# Patient Record
Sex: FEMALE | Race: BLACK OR AFRICAN AMERICAN | NOT HISPANIC OR LATINO | Employment: OTHER | URBAN - METROPOLITAN AREA
[De-identification: names, ages, dates, MRNs, and addresses within clinical notes are randomized per-mention and may not be internally consistent; named-entity substitution may affect disease eponyms.]

---

## 2022-09-22 ENCOUNTER — IMMUNIZATION (OUTPATIENT)
Dept: PRIMARY CARE CLINIC | Facility: CLINIC | Age: 72
End: 2022-09-22
Payer: MEDICARE

## 2022-09-22 DIAGNOSIS — Z23 NEED FOR VACCINATION: Primary | ICD-10-CM

## 2022-09-22 PROCEDURE — 91312 COVID-19, MRNA, LNP-S, BIVALENT BOOSTER, PF, 30 MCG/0.3 ML DOSE: CPT | Mod: PBBFAC | Performed by: INTERNAL MEDICINE

## 2022-09-22 PROCEDURE — 0124A COVID-19, MRNA, LNP-S, BIVALENT BOOSTER, PF, 30 MCG/0.3 ML DOSE: CPT | Mod: CV19,PBBFAC | Performed by: INTERNAL MEDICINE

## 2023-10-26 DIAGNOSIS — M25.561 RIGHT KNEE PAIN, UNSPECIFIED CHRONICITY: Primary | ICD-10-CM

## 2023-11-02 ENCOUNTER — HOSPITAL ENCOUNTER (OUTPATIENT)
Dept: RADIOLOGY | Facility: HOSPITAL | Age: 73
Discharge: HOME | End: 2023-11-02
Attending: ORTHOPAEDIC SURGERY
Payer: MEDICARE

## 2023-11-02 ENCOUNTER — OFFICE VISIT (OUTPATIENT)
Dept: ORTHOPEDICS | Facility: CLINIC | Age: 73
End: 2023-11-02
Payer: MEDICARE

## 2023-11-02 VITALS
HEART RATE: 79 BPM | BODY MASS INDEX: 36.6 KG/M2 | SYSTOLIC BLOOD PRESSURE: 149 MMHG | HEIGHT: 62 IN | DIASTOLIC BLOOD PRESSURE: 79 MMHG | WEIGHT: 198.88 LBS

## 2023-11-02 DIAGNOSIS — M25.561 RIGHT KNEE PAIN, UNSPECIFIED CHRONICITY: ICD-10-CM

## 2023-11-02 DIAGNOSIS — G89.29 CHRONIC PAIN OF RIGHT KNEE: ICD-10-CM

## 2023-11-02 DIAGNOSIS — M17.11 PRIMARY OSTEOARTHRITIS OF RIGHT KNEE: Primary | ICD-10-CM

## 2023-11-02 DIAGNOSIS — M25.561 CHRONIC PAIN OF RIGHT KNEE: ICD-10-CM

## 2023-11-02 PROCEDURE — 73564 XR KNEE COMP 4 OR MORE VIEWS RIGHT: ICD-10-PCS | Mod: 26,RT,, | Performed by: RADIOLOGY

## 2023-11-02 PROCEDURE — 99999PBSHW PR PBB SHADOW TECHNICAL ONLY FILED TO HB: Mod: PBBFAC,,,

## 2023-11-02 PROCEDURE — 20610 DRAIN/INJ JOINT/BURSA W/O US: CPT | Mod: PBBFAC,PN,RT | Performed by: ORTHOPAEDIC SURGERY

## 2023-11-02 PROCEDURE — 99204 OFFICE O/P NEW MOD 45 MIN: CPT | Mod: 25,S$PBB,, | Performed by: ORTHOPAEDIC SURGERY

## 2023-11-02 PROCEDURE — 99999 PR PBB SHADOW E&M-EST. PATIENT-LVL III: CPT | Mod: PBBFAC,,, | Performed by: ORTHOPAEDIC SURGERY

## 2023-11-02 PROCEDURE — 73564 X-RAY EXAM KNEE 4 OR MORE: CPT | Mod: TC,PN,RT

## 2023-11-02 PROCEDURE — 73564 X-RAY EXAM KNEE 4 OR MORE: CPT | Mod: 26,RT,, | Performed by: RADIOLOGY

## 2023-11-02 PROCEDURE — 99999 PR PBB SHADOW E&M-EST. PATIENT-LVL III: ICD-10-PCS | Mod: PBBFAC,,, | Performed by: ORTHOPAEDIC SURGERY

## 2023-11-02 PROCEDURE — 20610 LARGE JOINT ASPIRATION/INJECTION: R KNEE: ICD-10-PCS | Mod: S$PBB,RT,, | Performed by: ORTHOPAEDIC SURGERY

## 2023-11-02 PROCEDURE — 99999PBSHW PR PBB SHADOW TECHNICAL ONLY FILED TO HB: ICD-10-PCS | Mod: PBBFAC,,,

## 2023-11-02 PROCEDURE — 99213 OFFICE O/P EST LOW 20 MIN: CPT | Mod: PBBFAC,PN | Performed by: ORTHOPAEDIC SURGERY

## 2023-11-02 PROCEDURE — 99204 PR OFFICE/OUTPT VISIT, NEW, LEVL IV, 45-59 MIN: ICD-10-PCS | Mod: 25,S$PBB,, | Performed by: ORTHOPAEDIC SURGERY

## 2023-11-02 RX ORDER — KETOROLAC TROMETHAMINE 30 MG/ML
30 INJECTION, SOLUTION INTRAMUSCULAR; INTRAVENOUS
Status: DISCONTINUED | OUTPATIENT
Start: 2023-11-02 | End: 2023-11-02 | Stop reason: HOSPADM

## 2023-11-02 RX ORDER — BUPIVACAINE HYDROCHLORIDE 5 MG/ML
5 INJECTION, SOLUTION PERINEURAL
Status: DISCONTINUED | OUTPATIENT
Start: 2023-11-02 | End: 2023-11-02 | Stop reason: HOSPADM

## 2023-11-02 RX ORDER — LIDOCAINE HYDROCHLORIDE 10 MG/ML
4 INJECTION INFILTRATION; PERINEURAL
Status: DISCONTINUED | OUTPATIENT
Start: 2023-11-02 | End: 2023-11-02 | Stop reason: HOSPADM

## 2023-11-02 RX ADMIN — LIDOCAINE HYDROCHLORIDE 4 ML: 10 INJECTION, SOLUTION INFILTRATION; PERINEURAL at 09:11

## 2023-11-02 RX ADMIN — KETOROLAC TROMETHAMINE 30 MG: 30 INJECTION, SOLUTION INTRAMUSCULAR; INTRAVENOUS at 09:11

## 2023-11-02 RX ADMIN — BUPIVACAINE HYDROCHLORIDE 5 ML: 5 INJECTION, SOLUTION PERINEURAL at 09:11

## 2023-11-02 NOTE — PROGRESS NOTES
Subjective:      Patient ID: Luli Triplett is a 73 y.o. female.    Chief Complaint: Pain of the Right Knee      Patient ID: Luli Triplett is a 73 y.o. female.    Chief Complaint: Pain of the Right Knee      KNEE PAIN: Complains of pain to the rightknee.   PAIN LOCATED: anterior and medial  ONSET: 9 months ago.  QUALITY:  Patient states the pain is worsening  HISTORY: Previous knee injury/surgery: No  Hx:   ASSOCIATED SYMPTOM AND TRIGGERS:Standing/Weightbearing, walking, trouble w stairs, stiffness, swelling, limping, stiffness w sitting   USES ASSISTIVE DEVICE: cane  RELIEVED BY:rest, heat, ice, medication: NSAID, Tylenol used but not effective, avoiding painful activities  PATIENT DENIES: bruising, redness, deformity              Social History     Occupational History    Not on file   Tobacco Use    Smoking status: Never    Smokeless tobacco: Not on file   Substance and Sexual Activity    Alcohol use: No    Drug use: No    Sexual activity: Not on file      Review of Systems   Constitutional: Negative for diaphoresis.   HENT:  Negative for ear discharge, nosebleeds and stridor.    Eyes:  Negative for photophobia.   Cardiovascular:  Negative for syncope.   Respiratory:  Negative for hemoptysis, shortness of breath and wheezing.    Neurological:  Negative for tremors.   Psychiatric/Behavioral: Negative.           Objective:    General    Constitutional: She is oriented to person, place, and time. She appears well-developed.   HENT:   Head: Normocephalic and atraumatic.   Right Ear: External ear normal.   Left Ear: External ear normal.   Eyes: EOM are normal.   Cardiovascular:  Intact distal pulses.            Neurological: She is alert and oriented to person, place, and time.   Psychiatric: She has a normal mood and affect. Her behavior is normal. Judgment and thought content normal.     General Musculoskeletal Exam   Gait: antalgic and abnormal       Right Knee Exam     Inspection   Swelling:  present  Effusion: present    Tenderness   The patient is tender to palpation of the medial joint line.    Crepitus   The patient has crepitus of the patella.    Range of Motion   Extension:  10   Flexion:  100 abnormal     Tests   Ligament Examination   MCL - Valgus: normal (0 to 2mm)  LCL - Varus: normal    Other   Sensation: normal    Muscle Strength   Right Lower Extremity   Quadriceps:  4/5   Hamstrin/5          Assessment:       1. Primary osteoarthritis of right knee    2. Chronic pain of right knee          Plan:       We had a lengthy discussion regarding the natural history of osteoarthritis.   The patient would like to continue nonoperative management, and I think that is Reasonable. The patient has severe bone on bone knee oa. KL score 4  We went ahead and injected the right  with 1 dose of ketorolac, Marcaine, and lidocaine. We reviewed the risks (incl side effects and allergies), benefits, of all treatment options including injections.   We will see the patient back on a p.r.n. basis as their symptoms dictate.  We also did begin discussing total knee arthroplasty. The patient was given educational literature regarding knee OA and total knee arthroplasty.   I discussed a new treatment therapy called Iovera, which is cryotherapy, to provide symptomatic relief along the sensory distribution of the infrapatellar tendon branch of the saphenous nerve, AFCN, and LFCN.  The patient elected to move forward with this.  We did discuss the fact that this is a fairly novel procedure and the is very limited scientific data around this; however, it is FDA approved.  In a few patients there can be continued pain along the treated nerve but the exact risk has not been quantified but seems to be rare. The patient was given patient information and literature to review prior to the procedure as well. Based on this, the patient feels the benefits outweigh the risks.

## 2023-11-02 NOTE — PROCEDURES
Large Joint Aspiration/Injection: R knee    Date/Time: 11/2/2023 9:15 AM    Performed by: Amos Newman MD  Authorized by: Amos Newman MD    Consent Done?:  Yes (Verbal)  Indications:  Arthritis  Site marked: the procedure site was marked    Timeout: prior to procedure the correct patient, procedure, and site was verified    Prep: patient was prepped and draped in usual sterile fashion      Local anesthesia used?: Yes    Local anesthetic:  Topical anesthetic    Details:  Needle Size:  22 G  Ultrasonic Guidance for needle placement?: No    Approach:  Anterolateral  Location:  Knee  Site:  R knee  Medications:  30 mg ketorolac 30 mg/mL (1 mL); 5 mL BUPivacaine 0.5 % (5 mg/mL); 4 mL LIDOcaine HCL 10 mg/ml (1%) 10 mg/mL (1 %)  Patient tolerance:  Patient tolerated the procedure well with no immediate complications

## 2023-11-14 ENCOUNTER — PROCEDURE VISIT (OUTPATIENT)
Dept: ORTHOPEDICS | Facility: CLINIC | Age: 73
End: 2023-11-14
Payer: MEDICARE

## 2023-11-14 VITALS
DIASTOLIC BLOOD PRESSURE: 82 MMHG | WEIGHT: 201.5 LBS | BODY MASS INDEX: 37.08 KG/M2 | HEART RATE: 92 BPM | HEIGHT: 62 IN | SYSTOLIC BLOOD PRESSURE: 156 MMHG

## 2023-11-14 DIAGNOSIS — G89.29 CHRONIC PAIN OF RIGHT KNEE: ICD-10-CM

## 2023-11-14 DIAGNOSIS — M25.561 CHRONIC PAIN OF RIGHT KNEE: ICD-10-CM

## 2023-11-14 DIAGNOSIS — M17.11 PRIMARY OSTEOARTHRITIS OF RIGHT KNEE: ICD-10-CM

## 2023-11-14 PROCEDURE — 64640 INJECTION TREATMENT OF NERVE: CPT | Mod: S$PBB,RT,, | Performed by: ORTHOPAEDIC SURGERY

## 2023-11-14 PROCEDURE — 99499 UNLISTED E&M SERVICE: CPT | Mod: S$PBB,,, | Performed by: ORTHOPAEDIC SURGERY

## 2023-11-14 PROCEDURE — 64640 PR DESTRUCT BY NEURO AGENT; OTHER PERIPH NERVE: ICD-10-PCS | Mod: S$PBB,RT,, | Performed by: ORTHOPAEDIC SURGERY

## 2023-11-14 PROCEDURE — 99499 NO LOS: ICD-10-PCS | Mod: S$PBB,,, | Performed by: ORTHOPAEDIC SURGERY

## 2023-11-14 PROCEDURE — 64640 INJECTION TREATMENT OF NERVE: CPT | Mod: PBBFAC,PN | Performed by: ORTHOPAEDIC SURGERY

## 2023-11-14 NOTE — PROCEDURES
Procedure Note Iovera:    DATE OF PROCEDURE:  11/14/2023    PREOPERATIVE DIAGNOSIS: right knee pain.     POSTOPERATIVE DIAGNOSIS: right knee pain.     PROCEDURE: Iovera treatment of anterior femoral cutaneous nerve, Lateral femoral cut nerve, and both branches of infrapatellar saphenous nerve using at least 4 different punctures to treat all 4 nerves. (cpt 64640 x4)    ATTENDING SURGEON: Amos Newman M.D.   ASSISTANT: hillary couch    COMPLICATIONS: None.     IMPLANTS:  None    ESTIMATED BLOOD LOSS:  < 5cc    SPECIMENS REMOVED:  None    ANESTHESIA: Local lidocaine    INDICATIONS FOR PROCEDURE: This is a 73 y.o. female with longstanding knee pain. They have failed non operative management including injections.I discussed a new treatment therapy called Iovera, which is cryotherapy, to provide symptomatic relief along the sensory distribution of the infrapatellar tendon branch of the saphenous nerve  and AFCN. The patient elected to move forward with this  We did discuss the fact that this is a fairly novel procedure and there is very limited scientific data around this.  However, it FDA approved.  The patient was given patient information and literature to review prior to the procedure as well.  Based on this, the patient agreed to move forward with doing the procedure.      PROCEDURE:    The patient was placed supine on the exam table and the proximal medial aspect of the right tibia and anterior aspect of distal femur was prepped with sterile Betadine and alcohol.  A line was drawn extending approximately 5 cm medial to inferior pole of the patella distally to a point approximately 5 cm medial to the tibial tubercle.  A second line was drawn in a medial to lateral direction the width of the patella approximately 7 cm proximal to the patella. We then infiltrated the skin with lidocaine along both lines using a 25g needle. We then introduced the Iovera device along these lines and this device penetrated the skin, creating  cryotherapy to both branches of the infrapatellar saphenous nerve, a third treatment to the anterior femoral cutaneous nerve, and fourth LFCN. 7 punctures of the skin were made to treat the 2 branches of the ISN and another 7 punctures were made to treat the AFCN and LFCN. There were a total of 4 nerves treated with iovera. The patient tolerated the procedure well with no problems.

## 2024-07-25 ENCOUNTER — OFFICE VISIT (OUTPATIENT)
Dept: ORTHOPEDICS | Facility: CLINIC | Age: 74
End: 2024-07-25
Payer: MEDICARE

## 2024-07-25 ENCOUNTER — RESEARCH ENCOUNTER (OUTPATIENT)
Dept: RESEARCH | Facility: HOSPITAL | Age: 74
End: 2024-07-25
Payer: COMMERCIAL

## 2024-07-25 VITALS — BODY MASS INDEX: 37.08 KG/M2 | WEIGHT: 201.5 LBS | HEIGHT: 62 IN

## 2024-07-25 DIAGNOSIS — M25.561 CHRONIC PAIN OF RIGHT KNEE: Primary | ICD-10-CM

## 2024-07-25 DIAGNOSIS — M17.11 PRIMARY OSTEOARTHRITIS OF RIGHT KNEE: ICD-10-CM

## 2024-07-25 DIAGNOSIS — G89.29 CHRONIC PAIN OF RIGHT KNEE: Primary | ICD-10-CM

## 2024-07-25 PROCEDURE — G2211 COMPLEX E/M VISIT ADD ON: HCPCS | Mod: S$PBB,,, | Performed by: ORTHOPAEDIC SURGERY

## 2024-07-25 PROCEDURE — 99999 PR PBB SHADOW E&M-EST. PATIENT-LVL IV: CPT | Mod: PBBFAC,,, | Performed by: ORTHOPAEDIC SURGERY

## 2024-07-25 PROCEDURE — 99213 OFFICE O/P EST LOW 20 MIN: CPT | Mod: S$PBB,,, | Performed by: ORTHOPAEDIC SURGERY

## 2024-07-25 PROCEDURE — 99214 OFFICE O/P EST MOD 30 MIN: CPT | Mod: PBBFAC,PN | Performed by: ORTHOPAEDIC SURGERY

## 2024-07-25 NOTE — PROGRESS NOTES
Protocol: innovations in Genicular Outcomes Registry (Sharon)  Protocol#: Sharon  IRB#: [id number]  Version Date: 10-Patrick-2023  PI: Amos Newman MD  Patient Initials: C.F.     Study Recruitment Note:     Research coordinator was only able to meet with patient for a short period and therefore unable to completed review study with patient.     Patient was given study brochure and paper copy of consent form for review. We will talk with patient further when she returns for OA treatment.

## 2024-07-25 NOTE — PROGRESS NOTES
[facility] [address]          Subjective:     Patient ID: Luli Triplett is a 74 y.o. female.    Chief Complaint: Pain of the Right Knee    Knee Pain: right  swelling: Yes  worsens with activity: Yes  relieved by: 7 months relief with Iovera (11/2023)    Patient presents for follow up. Previous Iovera provided 7 months relief. Now interested in R TKA.         Past Medical History:   Diagnosis Date    High cholesterol     Hypertension     Macular degeneration         Past Surgical History:   Procedure Laterality Date    HYSTERECTOMY          Current Outpatient Medications   Medication Instructions    atorvastatin (LIPITOR) 40 mg, Oral, Daily    cetirizine (ZYRTEC) 10 mg, Oral, Daily    meclizine (ANTIVERT) 25 mg, Oral, 3 times daily PRN    metFORMIN (GLUCOPHAGE) 500 mg, Oral, With breakfast    MULTIVIT-MIN/FA/CALCIUM/VIT K1 (ONE-A-DAY WOMEN'S 50+ ORAL) Oral    triamterene-hydrochlorothiazide 37.5-25 mg (DYAZIDE) 37.5-25 mg per capsule 1 capsule, Oral, Every morning        Review of patient's allergies indicates:   Allergen Reactions    Asa [aspirin]     Codeine     Pcn [penicillins]        Social History     Socioeconomic History    Marital status: Single   Tobacco Use    Smoking status: Never   Substance and Sexual Activity    Alcohol use: No    Drug use: No       No family history on file.      Review of systems positive for .     Objective:   Physical Exam:     Right knee  Skin atraumatic   + effusion  Tenderness to palpation medial joint line, No tenderness to palpation lateral joint line  ROM 5-105  Stable anterior/posterior   Stable varus/valgus  No groin pain with rotation of hip  Grossly NVI distally    .X-Ray knee reviewed, KL 4 changes with joint space narrowing, sclerosis, and osteophytosis.       Assessment:        Luli Triplett is a 74 y.o. female with   Encounter Diagnoses   Name Primary?    Chronic pain of right knee Yes    Primary osteoarthritis of right knee        Plan :    -Plan for  Karmen right knee. I discussed a new treatment therapy called Iovera, which is cryotherapy, to provide symptomatic relief along the sensory distribution of the infrapatellar tendon branch of the saphenous nerve, AFCN, and LFCN.  The patient elected to move forward with this.  We did discuss the fact that this is a fairly novel procedure and the is very limited scientific data around this; however, it is FDA approved.  In a few patients there can be continued pain along the treated nerve but the exact risk has not been quantified but seems to be rare. The patient was given patient information and literature to review prior to the procedure as well. Based on this, the patient feels the benefits outweigh the risks.    -Plan for R TKA May 2025        Orders Placed This Encounter   Procedures    Karmen Mueller MD  [facility] PGY-3

## 2024-08-13 ENCOUNTER — PROCEDURE VISIT (OUTPATIENT)
Dept: ORTHOPEDICS | Facility: CLINIC | Age: 74
End: 2024-08-13
Payer: MEDICARE

## 2024-08-13 ENCOUNTER — RESEARCH ENCOUNTER (OUTPATIENT)
Dept: RESEARCH | Facility: HOSPITAL | Age: 74
End: 2024-08-13
Payer: COMMERCIAL

## 2024-08-13 VITALS — BODY MASS INDEX: 37.29 KG/M2 | WEIGHT: 202.63 LBS | HEIGHT: 62 IN

## 2024-08-13 DIAGNOSIS — M17.11 PRIMARY OSTEOARTHRITIS OF RIGHT KNEE: ICD-10-CM

## 2024-08-13 DIAGNOSIS — M25.561 CHRONIC PAIN OF RIGHT KNEE: ICD-10-CM

## 2024-08-13 DIAGNOSIS — G89.29 CHRONIC PAIN OF RIGHT KNEE: ICD-10-CM

## 2024-08-13 PROCEDURE — 99499 UNLISTED E&M SERVICE: CPT | Mod: S$PBB,,, | Performed by: ORTHOPAEDIC SURGERY

## 2024-08-13 PROCEDURE — 64640 INJECTION TREATMENT OF NERVE: CPT | Mod: PBBFAC,PN | Performed by: ORTHOPAEDIC SURGERY

## 2024-08-13 PROCEDURE — 64640 INJECTION TREATMENT OF NERVE: CPT | Mod: S$PBB,RT,, | Performed by: ORTHOPAEDIC SURGERY

## 2024-08-13 NOTE — PROGRESS NOTES
Protocol: innovations in Genicular Outcomes Registry (Sharon)  Protocol#: Sharon  IRB#: [id number]  Version Date: 10-Patrick-2023  PI: Amos Newman MD  Patient Initials: C.F.     Declined Study Note    Patient is in today to be treated for OA knee pain (Iovera for the Right knee). Patient was approached by Ortho provider about participation in Sharon Study. Patient declined study.

## 2024-08-13 NOTE — PROCEDURES
Procedure Note Iovera:    DATE OF PROCEDURE:  08/13/2024    PREOPERATIVE DIAGNOSIS: right knee pain.     POSTOPERATIVE DIAGNOSIS: right knee pain.     PROCEDURE: Iovera treatment of anterior femoral cutaneous nerve, Lateral femoral cut nerve, and both branches of infrapatellar saphenous nerve using at least 4 different punctures to treat all 4 nerves. (cpt 64640 x4)    ATTENDING SURGEON: Amos Newman M.D.   ASSISTANT: hillary couch    COMPLICATIONS: None.     IMPLANTS:  None    ESTIMATED BLOOD LOSS:  < 5cc    SPECIMENS REMOVED:  None    ANESTHESIA: Local lidocaine    INDICATIONS FOR PROCEDURE: This is a 74 y.o. female with longstanding knee pain. They have failed non operative management including injections.I discussed a new treatment therapy called Iovera, which is cryotherapy, to provide symptomatic relief along the sensory distribution of the infrapatellar tendon branch of the saphenous nerve  and AFCN. The patient elected to move forward with this  We did discuss the fact that this is a fairly novel procedure and there is very limited scientific data around this.  However, it FDA approved.  The patient was given patient information and literature to review prior to the procedure as well.  Based on this, the patient agreed to move forward with doing the procedure.      PROCEDURE:    The patient was placed supine on the exam table and the proximal medial aspect of the right tibia and anterior aspect of distal femur was prepped with sterile Betadine and alcohol.  A line was drawn extending approximately 5 cm medial to inferior pole of the patella distally to a point approximately 5 cm medial to the tibial tubercle.  A second line was drawn in a medial to lateral direction the width of the patella approximately 7 cm proximal to the patella. We then infiltrated the skin with lidocaine along both lines using a 25g needle. We then introduced the Iovera device along these lines and this device penetrated the skin, creating  cryotherapy to both branches of the infrapatellar saphenous nerve, a third treatment to the anterior femoral cutaneous nerve, and fourth LFCN. 7 punctures of the skin were made to treat the 2 branches of the ISN and another 7 punctures were made to treat the AFCN and LFCN. There were a total of 4 nerves treated with iovera. The patient tolerated the procedure well with no problems.

## 2025-03-17 DIAGNOSIS — M25.561 CHRONIC PAIN OF RIGHT KNEE: ICD-10-CM

## 2025-03-17 DIAGNOSIS — M17.11 PRIMARY OSTEOARTHRITIS OF RIGHT KNEE: Primary | ICD-10-CM

## 2025-03-17 DIAGNOSIS — M25.561 RIGHT KNEE PAIN, UNSPECIFIED CHRONICITY: ICD-10-CM

## 2025-03-17 DIAGNOSIS — G89.29 CHRONIC PAIN OF RIGHT KNEE: ICD-10-CM

## 2025-03-20 ENCOUNTER — CLINICAL SUPPORT (OUTPATIENT)
Dept: LAB | Facility: HOSPITAL | Age: 75
End: 2025-03-20
Attending: ORTHOPAEDIC SURGERY
Payer: MEDICARE

## 2025-03-20 ENCOUNTER — HOSPITAL ENCOUNTER (OUTPATIENT)
Dept: RADIOLOGY | Facility: HOSPITAL | Age: 75
Discharge: HOME | End: 2025-03-20
Attending: ORTHOPAEDIC SURGERY
Payer: MEDICARE

## 2025-03-20 ENCOUNTER — OFFICE VISIT (OUTPATIENT)
Dept: ORTHOPEDICS | Facility: CLINIC | Age: 75
End: 2025-03-20
Payer: MEDICARE

## 2025-03-20 ENCOUNTER — HOSPITAL ENCOUNTER (OUTPATIENT)
Facility: HOSPITAL | Age: 75
Discharge: HOME | End: 2025-03-20
Attending: ORTHOPAEDIC SURGERY
Payer: MEDICARE

## 2025-03-20 VITALS — BODY MASS INDEX: 37.29 KG/M2 | WEIGHT: 202.63 LBS | HEIGHT: 62 IN

## 2025-03-20 DIAGNOSIS — M17.11 PRIMARY OSTEOARTHRITIS OF RIGHT KNEE: ICD-10-CM

## 2025-03-20 DIAGNOSIS — I10 HYPERTENSION, UNSPECIFIED TYPE: ICD-10-CM

## 2025-03-20 DIAGNOSIS — G89.29 CHRONIC PAIN OF RIGHT KNEE: Primary | ICD-10-CM

## 2025-03-20 DIAGNOSIS — M25.561 RIGHT KNEE PAIN, UNSPECIFIED CHRONICITY: ICD-10-CM

## 2025-03-20 DIAGNOSIS — M62.81 QUADRICEPS WEAKNESS: ICD-10-CM

## 2025-03-20 DIAGNOSIS — G89.29 CHRONIC PAIN OF RIGHT KNEE: ICD-10-CM

## 2025-03-20 DIAGNOSIS — M25.561 CHRONIC PAIN OF RIGHT KNEE: Primary | ICD-10-CM

## 2025-03-20 DIAGNOSIS — M25.561 CHRONIC PAIN OF RIGHT KNEE: ICD-10-CM

## 2025-03-20 LAB
OHS QRS DURATION: 88 MS
OHS QTC CALCULATION: 412 MS

## 2025-03-20 PROCEDURE — 77073 BONE LENGTH STUDIES: CPT | Mod: 26,,, | Performed by: RADIOLOGY

## 2025-03-20 PROCEDURE — 93005 ELECTROCARDIOGRAM TRACING: CPT

## 2025-03-20 PROCEDURE — G2211 COMPLEX E/M VISIT ADD ON: HCPCS | Mod: S$PBB,,, | Performed by: ORTHOPAEDIC SURGERY

## 2025-03-20 PROCEDURE — 99999 PR PBB SHADOW E&M-EST. PATIENT-LVL III: CPT | Mod: PBBFAC,,, | Performed by: ORTHOPAEDIC SURGERY

## 2025-03-20 PROCEDURE — 99215 OFFICE O/P EST HI 40 MIN: CPT | Mod: S$PBB,,, | Performed by: ORTHOPAEDIC SURGERY

## 2025-03-20 PROCEDURE — 71045 X-RAY EXAM CHEST 1 VIEW: CPT | Mod: 26,,, | Performed by: RADIOLOGY

## 2025-03-20 PROCEDURE — 77073 BONE LENGTH STUDIES: CPT | Mod: TC,PN

## 2025-03-20 PROCEDURE — 93010 ELECTROCARDIOGRAM REPORT: CPT | Mod: ,,, | Performed by: INTERNAL MEDICINE

## 2025-03-20 PROCEDURE — 71045 X-RAY EXAM CHEST 1 VIEW: CPT | Mod: TC,FY

## 2025-03-20 PROCEDURE — 99213 OFFICE O/P EST LOW 20 MIN: CPT | Mod: PBBFAC,PN | Performed by: ORTHOPAEDIC SURGERY

## 2025-03-20 NOTE — PROGRESS NOTES
Subjective:      Patient ID: Luli Triplett is a 75 y.o. female.    Chief Complaint: Pain of the Right Knee    Knee Pain: right  swelling: Yes  worsens with activity: Yes  relieved by: injections         Social History     Occupational History    Not on file   Tobacco Use    Smoking status: Never    Smokeless tobacco: Not on file   Substance and Sexual Activity    Alcohol use: No    Drug use: No    Sexual activity: Not on file      Social Drivers of Health     Tobacco Use: Unknown (3/20/2025)    Patient History     Smoking Tobacco Use: Never     Smokeless Tobacco Use: Unknown     Passive Exposure: Not on file   Alcohol Use: Not on file   Financial Resource Strain: Not on file   Food Insecurity: Not on file   Transportation Needs: Not on file   Physical Activity: Not on file   Stress: Not on file   Housing Stability: Not on file   Depression: None or minimal depression (9/30/2024)    [facility]    PHQ-9     Patient Health Questionnaire-9 Score: 2   Recent Concern: Depression - At risk (9/30/2024)    Received from [facility]    PHQ-2     Patient Health Questionnaire-2 Score: 2   Utilities: Not on file   Health Literacy: Not on file   Social Isolation: Not on file      Review of Systems   Constitutional: Negative for diaphoresis.   HENT:  Negative for ear discharge, nosebleeds and stridor.    Eyes:  Negative for photophobia.   Cardiovascular:  Negative for syncope.   Respiratory:  Negative for hemoptysis, shortness of breath and wheezing.    Neurological:  Negative for tremors.   Psychiatric/Behavioral: Negative.           Objective:    General    Constitutional: She is oriented to person, place, and time. She appears well-developed.   HENT:   Head: Normocephalic and atraumatic.   Right Ear: External ear normal.   Left Ear: External ear normal.   Eyes: EOM are normal.   Cardiovascular:  Intact distal pulses.            Neurological: She is alert and oriented to person, place, and time.   Psychiatric:  She has a normal mood and affect. Her behavior is normal. Judgment and thought content normal.     General Musculoskeletal Exam   Gait: antalgic and abnormal       Right Knee Exam     Inspection   Swelling: present  Effusion: present    Tenderness   The patient is tender to palpation of the medial joint line.    Crepitus   The patient has crepitus of the patella.    Range of Motion   Flexion:  abnormal     Other   Sensation: normal    Muscle Strength   Right Lower Extremity   Quadriceps:  4/5   Hamstrin/5          Assessment:       1. Chronic pain of right knee    2. Primary osteoarthritis of right knee    3. Hypertension, unspecified type    4. Quadriceps weakness          Plan:   The patient has failed non operative management, has painful crepitus, and has swelling. The natural history of severe degenerative arthritis was discussed at length and nonoperative and operative treatment was reviewed. Due to the pain and associated disability, I recommend right total knee replacement for KL 4.  The risks, benefits, convalescence, and alternatives were reviewed.  Numerous questions were asked and answered.  Models were used as an education tool.  Surgery will be scheduled at a convenient time.  The discussion with the patient included a description of a total knee replacement.  A total knee replacement (TKR) means a resurfacing of all three surfaces-the patella, femur, and tibia, with metal and plastic parts. The prosthetic parts are usually cemented into position and will allow a patient a full range of motion from. The postoperative motion, however, is determined by multiple factors, the most important of which is preoperative motion. In general, the better the motion preoperatively, the better the motion postoperatively.  In patients with good bone stock and bone quality (ie males, younger patients) I will generally use an uncemented tibial component and leave the patella unresurfaced, in an effort to preserve  bone stock for any future revision surgeries. In those patients we discuss the risk of anterior knee pain in a small subset of patients (5-10%) with an unresurfaced patella. The operation, pending medical clearance, generally requires a hospitalization of 0-3 days for one knee (possibly longer for a bilateral replacement). In general, we prefer to perform the procedure under epidural/spinal anesthesia.  Patient blood donation will be based on the individual patient but is not common and based on preoperative hemoglobin/hematocrit levels.The operation will be done preferably in a minimally invasive fashion which will facilitate recovery.  While performing the procedure in a minimally invasive manner is our preference, some patients are not candidates.  In that situation, a non-minimally invasive technique will be performed.  This will not adversely affect the long term success of the TKR.  Postoperatively, the patient is  walking the day of surgery and may be discahrged the day of surgery if stable with a walker or cane.  The first couple of days can be painful and the pain medication will alleviate but not eliminate the pain.  Thus, the patient must really push hard to get the range of motion.   The cane is to be dispensed with once the patient is secure enough.  In general, there is no cast or brace required with a routine knee replacement. In the long term, we do not encourage our patients to run for the sake of running; although, pending their preoperative status, we often allow patients to play doubles tennis or comparable activities.  We also allow them to do gentle intermediate downhill skiing if they are truly an expert skier.  Biking is encouraged as well as swimming.  The follow-up periods are usually at 2 weeks, 3 months, six months, and yearly intervals.  Potential complications with total knee replacements include infection (less than 2%), which is much higher in patients with obesity, diabetes, or other  conditions which adversely affect the immune system.  (Patients with a previous history of osteomyelitis can have infection rates as high as 15%).  By nerve damage we mean a peroneal nerve palsy with a foot drop (a flapping foot with ambulation).  This is particularly more apt to occur with a bad valgus (knock knee) deformity.  The incidence can be quite high in this particular patient population.  There are always areas of skin numbness, but this is not an untoward effect, nor do we consider it a complication.  Other potential complications include dislocation of the patellar component (less than 2%).  The loosening of either the tibial or femoral component is much more infrequent.  It usually occurs with infection or long-term use in a patient population that is at extreme risk (e.g., markedly overweight and not conscientious about their exercises).  Major blood vessel damage is also extremely rare.  Theoretically, because of the anatomic proximity of the popliteal artery, it could be lacerated with subsequent repairs required.  Albeit unlikely, a disruption of the popliteal artery could theoretically result in an amputation.  Similarly, infection could theoretically result in an amputation if one were to grow out an organism that cannot be controlled with antibiotics.  General medical complications include phlebitis for which we prophylactically anticoagulate, but it could still occur and fatal pulmonary embolus has been reported.  Cardiovascular problems, such as a heart attack or ischemia, are always a concern with such hemodynamic changes in the blood vascular system.  Our goal is to improve at least 80% of the pain and hopefully 100% but some aspects of the patients anatomy or other factors may not provide complete pain relief. Other general complications are very rare but in medicine anything could theoretically happen.  Patient is on chronic anticoagulation, we would like the patient off any anticoagulation  at least 72 hours before surgery to enable us to perform neuraxial anesthesia.  We also discussed performing a pre-operative peripheral sensory block of the bracnhes of the AFCN and ISN of the same knee using iovera to help with pain control post surgery. This is a relatively new technology with early data demonstrating significant pain improvement and functional recovery. However the science defining all the associated risks is still developing. From what we know thus far, a small number of patients can have nerve pain along the areas of the treated nerves. I think the patient understands the risk benefit ratio of total knee replacement and the iovera treatment and would like to pursue the total knee replacement and iovera treatment. we will begin the pre-operative process.  The mobility limitation cannot be sufficiently resolved by the use of a cane. Patient's functional mobility deficit can be sufficiently resolved with the use of a (Rollator, Rolling Walker or Walker). Patient's mobility limitation significantly impairs their ability to participate in one of more activities of daily living. The use of a (Rollator, RW or Walker) will significantly improve the patient's ability to participate in MRADLS and the patient will use it on regular basis in the home.

## 2025-04-09 ENCOUNTER — OFFICE VISIT (OUTPATIENT)
Dept: FAMILY MEDICINE | Facility: HOSPITAL | Age: 75
End: 2025-04-09
Attending: NURSE PRACTITIONER
Payer: MEDICARE

## 2025-04-09 VITALS
DIASTOLIC BLOOD PRESSURE: 75 MMHG | OXYGEN SATURATION: 100 % | BODY MASS INDEX: 36.71 KG/M2 | WEIGHT: 199.5 LBS | HEART RATE: 78 BPM | HEIGHT: 62 IN | SYSTOLIC BLOOD PRESSURE: 132 MMHG | RESPIRATION RATE: 18 BRPM

## 2025-04-09 DIAGNOSIS — Z01.818 PRE-OP EXAM: Primary | ICD-10-CM

## 2025-04-09 PROCEDURE — 99214 OFFICE O/P EST MOD 30 MIN: CPT

## 2025-04-09 NOTE — PROGRESS NOTES
"\[facility]\"" Family Medicine    Subjective:     Luli Triplett is a 75 y.o. year old female presents to clinic for preoperative evaluation for upcoming right knee TKA scheduled on May 5.  she denies any h/o blood dyscrasias, no family history of blood disorders, no negative reaction to anesthesia, and no history of stroke/MI.  Patient is not currently taking any anti-platelet and/or anticoagulation medication.  Patient denies any other acute complaints to address today. Denies N/V/F/C/CP/SOB/VALLADARES.  Ms. Rockwell endorses medication compliance with current medical management regimen. She  denies any negative side effects with current medication regimen.  she denies tobacco use.        Patient Active Problem List    Diagnosis Date Noted    Quadriceps weakness 03/20/2025    Hypertension     Primary osteoarthritis of right knee 11/02/2023    Chronic pain of right knee 11/02/2023        Review of patient's allergies indicates:   Allergen Reactions    Asa [aspirin]     Codeine     Pcn [penicillins]         Past Medical History:   Diagnosis Date    High cholesterol     Hypertension     Macular degeneration       Past Surgical History:   Procedure Laterality Date    HYSTERECTOMY        No family history on file.   Social History     Tobacco Use    Smoking status: Never    Smokeless tobacco: Not on file   Substance Use Topics    Alcohol use: No        Objective:     Vitals:    04/09/25 0908   Resp: 18   Weight: 90.5 kg (199 lb 8.3 oz)   Height: 5' 2" (1.575 m)     BMI: Body mass index is 36.49 kg/m².    Physical Exam  Vitals reviewed.   Constitutional:       General: She is not in acute distress.     Appearance: Normal appearance. She is not ill-appearing, toxic-appearing or diaphoretic.   HENT:      Head: Normocephalic and atraumatic.   Eyes:      General: No scleral icterus.     Extraocular Movements: Extraocular movements intact.      Pupils: Pupils are equal, round, and reactive to light.   Cardiovascular:      Rate and Rhythm: " Normal rate and regular rhythm.      Pulses: Normal pulses.      Heart sounds: Normal heart sounds. No murmur heard.     No friction rub. No gallop.   Pulmonary:      Effort: No respiratory distress.      Breath sounds: No stridor. No wheezing or rhonchi.   Abdominal:      General: Abdomen is flat. There is no distension.      Palpations: Abdomen is soft.      Tenderness: There is no abdominal tenderness. There is no guarding.   Musculoskeletal:         General: Normal range of motion.      Right lower leg: No edema.      Left lower leg: No edema.      Comments: Right knee no tenderness to palpation, no obvious dislocation.  No joint laxity   Skin:     General: Skin is warm and dry.      Coloration: Skin is not jaundiced.      Findings: No bruising or rash.   Neurological:      General: No focal deficit present.      Mental Status: She is alert and oriented to person, place, and time.          Assessment/Plan:     Luli Triplett is a 75 y.o. year old female who presents to clinic for pre op exam    1. Pre-op exam (Primary)    Pre-operative evaluation with risk assessment              -           Scheduled for right knee TKA on May 5, 2025 by Dr. Newman  -           DASI score: 42.7 w/ Functional Capacity in METS: 7.99 (>4) with a revised cardiac index   risk of 3.9%    - not on antiplatelets/anticoagulation.   - No h/o blood dyscrasias or previous reaction to anesthesia   - Not currently a smoker.   - has prior h/o HLD/CAD w/ no MI or CVA. Continue medical management w/ atorvastatin 40  - has no prior h/o DM (only history of pre-DM). Last HgbA1C: 6.1 two months ago 2/6/25  - has no prior h/o thyroid disease. TSH: 3.14 in 2024  - has no prior h/o HTN. BP: 132/75  - has no prior h/o COPD/Asthma/FAROOQ/OHS. Does not use CPAP.   - has no prior h/o HF. Echo: no prior echo  - BMI: Body mass index is 36.49 kg/m².              -           The patient is medically optimized with a low to moderate risk to proceed with (per  ACC/AHA dequan-operative guidelines) a moderate risk surgery.   - Please call our office for any additional questions or concerns.        Duke Activity Status Index (DASI) from [place]  on 4/9/2025  ** All calculations should be rechecked by clinician prior to use **    RESULT SUMMARY:  42.7 points  The higher the score (maximum 58.2), the higher the functional status.    7.99 METs        INPUTS:  Take care of self --> 2.75 = Yes  Walk indoors --> 1.75 = Yes  Walk 1&ndash;2 blocks on level ground --> 2.75 = Yes  Climb a flight of stairs or walk up a hill --> 5.5 = Yes  Run a short distance --> 0 = No  Do light work around the house --> 2.7 = Yes  Do moderate work around the house --> 3.5 = Yes  Do heavy work around the house --> 8 = Yes  Do yardwork --> 4.5 = Yes  Have sexual relations --> 5.25 = Yes  Participate in moderate recreational activities --> 6 = Yes  Participate in strenuous sports --> 0 = No      Revised Cardiac Risk Index for Pre-Operative Risk from [employer]  on 4/9/2025  ** All calculations should be rechecked by clinician prior to use **    RESULT SUMMARY:  0 points  RCRI Score    3.9 %  Risk of major cardiac event      INPUTS:  High-risk surgery --> 0 = No  History of ischemic heart disease --> 0 = No  History of congestive heart failure --> 0 = No  History of cerebrovascular disease --> 0 = No  Pre-operative treatment with insulin --> 0 = No  Pre-operative creatinine >2 mg/dL / 176.8 µmol/L --> 0 = No        Follow-up:as needed    A total of 20 minutes was spent on patient care during this encounter which included chart review, examining the patient, formulating a treatment plan and documentation.       Dhiraj Reed MD  \[facility]\"" Family Medicine, PGY-2  04/09/2025

## 2025-04-09 NOTE — H&P (VIEW-ONLY)
"[facility] Family Medicine    Subjective:     Luli Triplett is a 75 y.o. year old female presents to clinic for preoperative evaluation for upcoming right knee TKA scheduled on May 5.  she denies any h/o blood dyscrasias, no family history of blood disorders, no negative reaction to anesthesia, and no history of stroke/MI.  Patient is not currently taking any anti-platelet and/or anticoagulation medication.  Patient denies any other acute complaints to address today. Denies N/V/F/C/CP/SOB/VALLADARES.  Ms. Rockwell endorses medication compliance with current medical management regimen. She  denies any negative side effects with current medication regimen.  she denies tobacco use.        Patient Active Problem List    Diagnosis Date Noted    Quadriceps weakness 03/20/2025    Hypertension     Primary osteoarthritis of right knee 11/02/2023    Chronic pain of right knee 11/02/2023        Review of patient's allergies indicates:   Allergen Reactions    Asa [aspirin]     Codeine     Pcn [penicillins]         Past Medical History:   Diagnosis Date    High cholesterol     Hypertension     Macular degeneration       Past Surgical History:   Procedure Laterality Date    HYSTERECTOMY        No family history on file.   Social History     Tobacco Use    Smoking status: Never    Smokeless tobacco: Not on file   Substance Use Topics    Alcohol use: No        Objective:     Vitals:    04/09/25 0908   Resp: 18   Weight: 90.5 kg (199 lb 8.3 oz)   Height: 5' 2" (1.575 m)     BMI: Body mass index is 36.49 kg/m².    Physical Exam  Vitals reviewed.   Constitutional:       General: She is not in acute distress.     Appearance: Normal appearance. She is not ill-appearing, toxic-appearing or diaphoretic.   HENT:      Head: Normocephalic and atraumatic.   Eyes:      General: No scleral icterus.     Extraocular Movements: Extraocular movements intact.      Pupils: Pupils are equal, round, and reactive to light.   Cardiovascular:      Rate and Rhythm: " Normal rate and regular rhythm.      Pulses: Normal pulses.      Heart sounds: Normal heart sounds. No murmur heard.     No friction rub. No gallop.   Pulmonary:      Effort: No respiratory distress.      Breath sounds: No stridor. No wheezing or rhonchi.   Abdominal:      General: Abdomen is flat. There is no distension.      Palpations: Abdomen is soft.      Tenderness: There is no abdominal tenderness. There is no guarding.   Musculoskeletal:         General: Normal range of motion.      Right lower leg: No edema.      Left lower leg: No edema.      Comments: Right knee no tenderness to palpation, no obvious dislocation.  No joint laxity   Skin:     General: Skin is warm and dry.      Coloration: Skin is not jaundiced.      Findings: No bruising or rash.   Neurological:      General: No focal deficit present.      Mental Status: She is alert and oriented to person, place, and time.          Assessment/Plan:     Luli Triplett is a 75 y.o. year old female who presents to clinic for pre op exam    1. Pre-op exam (Primary)    Pre-operative evaluation with risk assessment              -           Scheduled for right knee TKA on May 5, 2025 by Dr. Newman  -           DASI score: 42.7 w/ Functional Capacity in METS: 7.99 (>4) with a revised cardiac index   risk of 3.9%    - not on antiplatelets/anticoagulation.   - No h/o blood dyscrasias or previous reaction to anesthesia   - Not currently a smoker.   - has prior h/o HLD/CAD w/ no MI or CVA. Continue medical management w/ atorvastatin 40  - has no prior h/o DM (only history of pre-DM). Last HgbA1C: 6.1 two months ago 2/6/25  - has no prior h/o thyroid disease. TSH: 3.14 in 2024  - has no prior h/o HTN. BP: 132/75  - has no prior h/o COPD/Asthma/FAROOQ/OHS. Does not use CPAP.   - has no prior h/o HF. Echo: no prior echo  - BMI: Body mass index is 36.49 kg/m².              -           The patient is medically optimized with a low to moderate risk to proceed with (per  ACC/AHA dequan-operative guidelines) a moderate risk surgery.   - Please call our office for any additional questions or concerns.        Duke Activity Status Index (DASI) from [employer]  on 4/9/2025  ** All calculations should be rechecked by clinician prior to use **    RESULT SUMMARY:  42.7 points  The higher the score (maximum 58.2), the higher the functional status.    7.99 METs        INPUTS:  Take care of self --> 2.75 = Yes  Walk indoors --> 1.75 = Yes  Walk 1&ndash;2 blocks on level ground --> 2.75 = Yes  Climb a flight of stairs or walk up a hill --> 5.5 = Yes  Run a short distance --> 0 = No  Do light work around the house --> 2.7 = Yes  Do moderate work around the house --> 3.5 = Yes  Do heavy work around the house --> 8 = Yes  Do yardwork --> 4.5 = Yes  Have sexual relations --> 5.25 = Yes  Participate in moderate recreational activities --> 6 = Yes  Participate in strenuous sports --> 0 = No      Revised Cardiac Risk Index for Pre-Operative Risk from [employer]  on 4/9/2025  ** All calculations should be rechecked by clinician prior to use **    RESULT SUMMARY:  0 points  RCRI Score    3.9 %  Risk of major cardiac event      INPUTS:  High-risk surgery --> 0 = No  History of ischemic heart disease --> 0 = No  History of congestive heart failure --> 0 = No  History of cerebrovascular disease --> 0 = No  Pre-operative treatment with insulin --> 0 = No  Pre-operative creatinine >2 mg/dL / 176.8 µmol/L --> 0 = No        Follow-up:as needed    A total of 20 minutes was spent on patient care during this encounter which included chart review, examining the patient, formulating a treatment plan and documentation.       Dhiraj Reed MD  [facility] Family Medicine, PGY-2  04/09/2025

## 2025-04-14 ENCOUNTER — TELEPHONE (OUTPATIENT)
Dept: ORTHOPEDICS | Facility: CLINIC | Age: 75
End: 2025-04-14
Payer: COMMERCIAL

## 2025-04-14 NOTE — TELEPHONE ENCOUNTER
----- Message from Tiffanie sent at 4/14/2025  2:41 PM CDT -----  Type:  Needs Medical AdviceWho Called: Kandice Call Back Number: [phone] Information: Patient is requesting a call back in regards to questions she has about surgery.

## 2025-04-15 ENCOUNTER — TELEPHONE (OUTPATIENT)
Dept: ORTHOPEDICS | Facility: CLINIC | Age: 75
End: 2025-04-15
Payer: COMMERCIAL

## 2025-04-15 NOTE — TELEPHONE ENCOUNTER
----- Message from Jesus Manuel sent at 4/15/2025  2:26 PM CDT -----  Pt Requesting Call BackJoaquin called: Lila called for pt:Best call back #:  [phone] notes: pt said she have some questions regarding her upcoming surgery

## 2025-04-15 NOTE — TELEPHONE ENCOUNTER
Spoke with patient Regarding surgery in May. Answered all patient questions regarding upcoming surgery. Patient verbally understand.

## 2025-04-21 ENCOUNTER — HOSPITAL ENCOUNTER (OUTPATIENT)
Dept: PREADMISSION TESTING | Facility: HOSPITAL | Age: 75
Discharge: HOME | End: 2025-04-21
Attending: NURSE PRACTITIONER
Payer: MEDICARE

## 2025-04-21 ENCOUNTER — ANESTHESIA EVENT (OUTPATIENT)
Dept: SURGERY | Facility: HOSPITAL | Age: 75
DRG: 470 | End: 2025-04-21
Payer: MEDICARE

## 2025-04-21 PROBLEM — R73.03 PRE-DIABETES: Status: ACTIVE | Noted: 2022-07-12

## 2025-04-21 PROBLEM — E78.5 HYPERLIPIDEMIA: Status: ACTIVE | Noted: 2022-07-12

## 2025-04-21 PROBLEM — E83.52 HYPERCALCEMIA: Status: ACTIVE | Noted: 2025-02-06

## 2025-04-21 PROBLEM — N39.41 URGE INCONTINENCE: Status: ACTIVE | Noted: 2025-04-03

## 2025-04-21 PROBLEM — H40.9 GLAUCOMA: Status: ACTIVE | Noted: 2022-07-12

## 2025-04-21 PROBLEM — R53.83 FATIGUE: Status: ACTIVE | Noted: 2024-04-18

## 2025-04-21 PROBLEM — L30.9 DERMATITIS: Status: ACTIVE | Noted: 2024-04-18

## 2025-04-21 PROBLEM — J30.9 ALLERGIC RHINITIS, MILD: Status: ACTIVE | Noted: 2023-01-17

## 2025-04-21 PROBLEM — D64.9 ANEMIA: Status: ACTIVE | Noted: 2025-02-06

## 2025-04-21 RX ORDER — LISINOPRIL 10 MG/1
10 TABLET ORAL DAILY
Status: ON HOLD | COMMUNITY

## 2025-04-21 RX ORDER — HYDROCHLOROTHIAZIDE 12.5 MG/1
12.5 TABLET ORAL DAILY
Status: ON HOLD | COMMUNITY

## 2025-04-21 RX ORDER — PSYLLIUM HUSK 0.4 G
600 CAPSULE ORAL 2 TIMES DAILY WITH MEALS
Status: ON HOLD | COMMUNITY

## 2025-04-21 NOTE — DISCHARGE INSTRUCTIONS
Your surgery is scheduled for 5/5/25.    Please report to [facility] on the 1st Floor at 0530 a.m.    THIS TIME IS SUBJECT TO CHANGE.  YOU WILL RECEIVE A PHONE CALL THE DAY BEFORE SURGERY BY 3:30 PM TO CONFIRM YOUR TIME OF ARRIVAL.  IF YOU HAVE NOT RECEIVED A PHONE CALL BY 3:30 PM THE DAY BEFORE YOUR SURGERY PLEASE CALL [phone]     INSTRUCTIONS IMPORTANT!!!  ¨Stop full meals 8 hours prior to arrival, but you can consume clear liquids up to 2 hours prior to arrival time. Clear liquids include Gatorade, water, soda, black coffee or tea (no milk or creamer), and clear juices only.     Please take Lisinopril the morning of surgery. Please do not take Metformin the morning of surgery.            ____  Do not wear makeup, including mascara.  ____  No powder, lotions or creams to surgical area.  ____  Please remove all jewelry, including piercings and leave at home.  ____  No money or valuables needed. Please leave at home.  ____  Please bring any documents given by your doctor.  ____  If going home the same day, arrange for a ride home. You will not be able to             drive if Anesthesia was used.  ____  Children under 18 years require a parent / guardian present the entire time             they are in surgery / recovery.  ____  Wear loose fitting clothing. Allow for dressings, bandages.  ____  Stop Aspirin and blood thinners as directed by prescribing provider.  ____  Do not take any herbal, vitamins, and or supplements 2 weeks prior to surgery.  ____  Stop NSAIDS (Naproxen, Aleve, Voltaren, Celebrex, Melxoicam), Goody's, and BC powder 7 days prior to surgery.  ____  Wash the surgical area with Hibiclens or Dial Antibacterial bar soap the night before surgery, and again the             morning of surgery.  Be sure to rinse hibiclens or Dial Antibacterial bar soap off completely (if instructed by   nurse).  ____  If you take diabetic medication including Metformin, Glimepiride, Glipizide, Glyburide,  Byetta, Januvia, Actos, do not take am of surgery unless            instructed by Doctor.  ____  Call MD for temperature above 101 degrees or any other signs of infection such as Urinary (bladder) infection, Upper respiratory infection, skin boils,             etc.  ____ Do Not wear your contact lenses the day of your procedure.  You may wear your glasses.      ____ Do not shave surgical site for 3 days prior to surgery.  ____ Practice Good hand washing before, during, and after procedure.  ____ Hold the following medication for 3 days prior to surgery:    Invokana (Canagliflozin)     Synjardy XR (jardiance + metformin)  Farxiga (Dapagliflozin)     Segluroment (steglarto + metformin)  Jardiance (Empagliflozin)     Qtern (farxiga + saxagliptin)   Steglatro (Ertugliflozin)     Glyxambi (jardiance + linagliptin)  Benzavvy (Bexagliflozin)     Steglujan (steglarto + sitagliptin)  Inpefa (Sotagliflozin)      Xigduo (farxiga + metformin)   Invokamet/Invokamet XR (invokana + Metformin)       I have read or had read and explained to me, and understand the above information.  Additional comments or instructions:  For additional questions call [phone]      ANESTHESIA SIDE EFFECTS  -For the first 24 hours after surgery:  Do not drive, use heavy equipment, make important decisions, or drink alcohol  -It is normal to feel sleepy for several hours.  Rest until you are more awake.  -Have someone stay with you, if needed.  They can watch for problems and help keep you safe.  -Some possible post anesthesia side effects include: nausea and vomiting, sore throat and hoarseness, sleepiness, and dizziness.        Pre-Op Bathing Instructions    Before surgery, you can play an important role in your own health.    Because skin is not sterile, we need to be sure that your skin is as free of germs as possible. By following the instructions below, you can reduce the number of germs on your skin before surgery.    IMPORTANT: You will need to  shower with a special soap called Hibiclens*, available at any pharmacy.  If you are allergic to Chlorhexidine (the antiseptic in Hibiclens), use an antibacterial soap such as Dial Soap for your preoperative shower.  You will shower with Hibiclens both the night before your surgery and the morning of your surgery.  Do not use Hibiclens on the head, face or genitals to avoid injury to those areas.    STEP #1: THE NIGHT BEFORE YOUR SURGERY     Do not shave the area of your body where your surgery will be performed.  Shower and wash your hair and body as usual with your normal soap and shampoo.  Rinse your hair and body thoroughly after you shower to remove all soap residue.  With your hand, apply one packet of Hibiclens soap to the surgical site.   Wash the site gently for five (5) minutes. Do not scrub your skin too hard.   Do not wash with your regular soap after Hibiclens is used.  Rinse your body thoroughly.  Pat yourself dry with a clean, soft towel.  Do not use lotion, cream, or powder.  Wear clean clothes.    STEP #2: THE MORNING OF YOUR SURGERY     Repeat Step #1.    * Not to be used by people allergic to Chlorhexidine.

## 2025-04-21 NOTE — PRE-PROCEDURE INSTRUCTIONS
Brother.    Allergies, medical, surgical, family and psychosocial histories reviewed with patient. Periop plan of care reviewed. Preop instructions given, including medications to take and to hold. Hibiclens soap and instructions on use given. Time allotted for questions to be addressed.      Arrival time 0530.    Please take Lisinopril the morning of surgery. Please do not take Metformin the morning of surgery.    Surgery instructions reviewed and surgery booklet sent or emailed to the patient via the portal.

## 2025-04-21 NOTE — ANESTHESIA PREPROCEDURE EVALUATION
"                                                                                                             04/21/2025  Luli Triplett is a 75 y.o., female scheduled for ARTHROPLASTY, KNEE, SIGHT ASSISTED (Right: Knee) 5/5/25.    Per family medicine Dr. Reed, "he patient is medically optimized with a low to moderate risk to proceed with (per ACC/AHA dequan-operative guidelines) a moderate risk surgery".    Past Medical History:   Diagnosis Date    High cholesterol     Hypertension     Macular degeneration      Past Surgical History:   Procedure Laterality Date    HYSTERECTOMY       Review of patient's allergies indicates:   Allergen Reactions    Asa [aspirin]     Codeine     Pcn [penicillins]      Current Outpatient Medications   Medication Instructions    atorvastatin (LIPITOR) 40 mg, Daily    calcium carbonate (OS-PARAS) 600 mg, Oral, 2 times daily with meals    cetirizine (ZYRTEC) 10 mg, Daily    hydroCHLOROthiazide 12.5 mg, Oral, Daily    lisinopriL 10 mg, Oral, Daily    meclizine (ANTIVERT) 25 mg, Oral, 3 times daily PRN    metFORMIN (GLUCOPHAGE) 500 mg, With breakfast    MULTIVIT-MIN/FA/CALCIUM/VIT K1 (ONE-A-DAY WOMEN'S 50+ ORAL) Take by mouth.    triamterene-hydrochlorothiazide 37.5-25 mg (DYAZIDE) 37.5-25 mg per capsule 1 capsule, Every morning       Pre-op Assessment    I have reviewed the Patient Summary Reports.     I have reviewed the Nursing Notes.    I have reviewed the Medications.     Review of Systems  Anesthesia Hx:  No problems with previous Anesthesia               Denies Personal Hx of Anesthesia complications.                    Social:  Non-Smoker, No Alcohol Use       Hematology/Oncology:       -- Anemia:                                  Cardiovascular:  Exercise tolerance: good   Denies Pacemaker. Hypertension       Denies Angina.     hyperlipidemia                               Pulmonary:  Pulmonary Normal      Denies Shortness of breath.                  Renal/:  Renal/ Normal           "       Hepatic/GI:  Hepatic/GI Normal                    Musculoskeletal:  Arthritis               Neurological:  Neurology Normal Denies TIA.  Denies CVA.    Denies Seizures.                                Endocrine:  Denies Diabetes.         Obesity / BMI > 30      Physical Exam  General: Cooperative and Oriented    Airway:  Neck ROM: Normal ROM    Dental:  Partial Dentures  Upper partial denture    Lab Results   Component Value Date    WBC 5.56 03/20/2025    HGB 12.1 03/20/2025    HCT 38.8 03/20/2025     03/20/2025    ALT 13 03/20/2025    AST 19 03/20/2025     03/20/2025    K 3.9 03/20/2025     03/20/2025    CREATININE 0.8 03/20/2025    BUN 20 03/20/2025    CO2 24 03/20/2025    HGBA1C 6.1 (H) 02/06/2025     Results for orders placed or performed in visit on 03/20/25   EKG 12-lead    Collection Time: 03/20/25 11:32 AM   Result Value Ref Range    QRS Duration 88 ms    OHS QTC Calculation 412 ms    Narrative    Test Reason : M25.561,G89.29,M17.11,I10,M62.81,    Vent. Rate :  78 BPM     Atrial Rate :  78 BPM     P-R Int : 188 ms          QRS Dur :  88 ms      QT Int : 362 ms       P-R-T Axes :  65  20  37 degrees    QTcB Int : 412 ms    Normal sinus rhythm  Normal ECG  When compared with ECG of 29-Aug-2016 00:25,  Nonspecific T wave abnormality no longer evident in Inferior leads  T wave inversion no longer evident in Anterior leads  QT has shortened  Confirmed by Jaida Carballo ([id number]) on 3/20/2025 10:17:32 PM    Referred By: LILIAM SHEPHERD           Confirmed By: Jaida Carballo     X-Ray Chest 1 View Pre-OP  Narrative: EXAMINATION:  XR CHEST 1 VIEW PRE-OP    CLINICAL HISTORY:  Pain in right knee    TECHNIQUE:  Single frontal view of the chest was performed.    COMPARISON:  None    FINDINGS:  Cardiomediastinal contour is within normal limits.The lungs are grossly clear.  No pneumothorax.No pleural effusions.  Impression: No acute findings.    Electronically signed by: Ayush Gill  MD  Date:    03/20/2025  Time:    13:51      Anesthesia Plan  Type of Anesthesia, risks & benefits discussed:    Anesthesia Type: Spinal  Intra-op Monitoring Plan: Standard ASA Monitors  Post Op Pain Control Plan: multimodal analgesia  Induction:  IV  Informed Consent: Informed consent signed with the Patient and all parties understand the risks and agree with anesthesia plan.  All questions answered.   ASA Score: 2  Anesthesia Plan Notes: Anesthesia consent to be signed prior to surgery 5/5/25      Ready For Surgery From Anesthesia Perspective.     .

## 2025-04-28 NOTE — DISCHARGE INSTRUCTIONS
Post Op Total Knee Arthroplasty Instructions     1. Enteric coated aspirin 81 mg by mouth twice a day for 6 weeks to prevent blood clots,  unless otherwise indicated.  2. Please take a stomach reflux medication such as pepcid, prevacid, nexium (H-2 blocker or PPI) while on aspirin to prevent stomach ulcers. You will be given a prescription for pepcid.  3. Poli stockings should be worn as much as possible for 6 weeks to prevent blood clots.  4. Do not start antibiotics for any suspected infections related to the surgery until evaluated by dr galloway staff  5. No driving for approximately 2-4 weeks  6. You can shower once the incision is completely dry, otherwise place a new dry dressing twice a day if there is drainage. Please call the office if the drainage increases after discharge.  7. You may resume all pre-surgery medications unless otherwise indicated  8. All patients should be seen in Dr Galloway office approx 2 weeks after surgery  9. Dr Newman prefers outpatient physical therapy upon discharge home. If home PT is needed, please contact Dr Newman for approval  10. Patients should see their primary care doctor after discharge home

## 2025-05-01 ENCOUNTER — PROCEDURE VISIT (OUTPATIENT)
Dept: ORTHOPEDICS | Facility: CLINIC | Age: 75
End: 2025-05-01
Payer: MEDICARE

## 2025-05-01 VITALS — WEIGHT: 195.31 LBS | HEIGHT: 62 IN | BODY MASS INDEX: 35.94 KG/M2

## 2025-05-01 DIAGNOSIS — G89.29 CHRONIC PAIN OF RIGHT KNEE: ICD-10-CM

## 2025-05-01 DIAGNOSIS — M25.561 CHRONIC PAIN OF RIGHT KNEE: ICD-10-CM

## 2025-05-01 PROCEDURE — 64640 INJECTION TREATMENT OF NERVE: CPT | Mod: S$PBB,RT,, | Performed by: PHYSICIAN ASSISTANT

## 2025-05-01 PROCEDURE — 99499 UNLISTED E&M SERVICE: CPT | Mod: S$PBB,,, | Performed by: PHYSICIAN ASSISTANT

## 2025-05-01 PROCEDURE — 64640 INJECTION TREATMENT OF NERVE: CPT | Mod: PBBFAC,PN | Performed by: PHYSICIAN ASSISTANT

## 2025-05-01 NOTE — PROCEDURES
Procedures    Procedure Note Iovera:    DATE OF PROCEDURE:  05/01/2025     PREOPERATIVE DIAGNOSIS: right knee pain.     POSTOPERATIVE DIAGNOSIS: right knee pain.     PROCEDURE: Iovera treatment of anterior femoral cutaneous nerve, Lateral femoral cut nerve, and both branches of infrapatellar saphenous nerve using at least 4 different punctures to treat all 4 nerves. (cpt 64640 x4)    COMPLICATIONS: None.     IMPLANTS:  None    ESTIMATED BLOOD LOSS:  < 5cc    SPECIMENS REMOVED:  None    ANESTHESIA: Local lidocaine    INDICATIONS FOR PROCEDURE: This is a 75 y.o. female with longstanding knee pain. They have failed non operative management including injections.I discussed a new treatment therapy called Iovera, which is cryotherapy, to provide symptomatic relief along the sensory distribution of the infrapatellar tendon branch of the saphenous nerve  and AFCN. The patient elected to move forward with this  We did discuss the fact that this is a fairly novel procedure and there is very limited scientific data around this.  However, it FDA approved.  The patient was given patient information and literature to review prior to the procedure as well.  Based on this, the patient agreed to move forward with doing the procedure.      PROCEDURE:    The patient was placed supine on the exam table and the proximal medial aspect of the right tibia and anterior aspect of distal femur was prepped with sterile Betadine and alcohol.  A line was drawn extending approximately 5 cm medial to inferior pole of the patella distally to a point approximately 5 cm medial to the tibial tubercle.  A second line was drawn in a medial to lateral direction the width of the patella approximately 7 cm proximal to the patella. We then infiltrated the skin with lidocaine along both lines using a 25g needle. We then introduced the Iovera device along these lines and this device penetrated the skin, creating cryotherapy to both branches of the  infrapatellar saphenous nerve, a third treatment to the anterior femoral cutaneous nerve, and fourth LFCN. 7 punctures of the skin were made to treat the 2 branches of the ISN and another 7 punctures were made to treat the AFCN and LFCN. There were a total of 4 nerves treated with iovera of each knee. The patient tolerated the procedure well with no problems.

## 2025-05-02 DIAGNOSIS — G89.29 CHRONIC PAIN OF RIGHT KNEE: Primary | ICD-10-CM

## 2025-05-02 DIAGNOSIS — M25.561 CHRONIC PAIN OF RIGHT KNEE: Primary | ICD-10-CM

## 2025-05-05 ENCOUNTER — HOSPITAL ENCOUNTER (INPATIENT)
Facility: HOSPITAL | Age: 75
LOS: 2 days | Discharge: NURSING, REHAB OR LONG-TERM CARE | DRG: 470 | End: 2025-05-08
Attending: ORTHOPAEDIC SURGERY | Admitting: ORTHOPAEDIC SURGERY
Payer: MEDICARE

## 2025-05-05 ENCOUNTER — ANESTHESIA (OUTPATIENT)
Dept: SURGERY | Facility: HOSPITAL | Age: 75
DRG: 470 | End: 2025-05-05
Payer: MEDICARE

## 2025-05-05 DIAGNOSIS — M17.11 PRIMARY OSTEOARTHRITIS OF RIGHT KNEE: Primary | ICD-10-CM

## 2025-05-05 DIAGNOSIS — M17.11 ARTHRITIS OF RIGHT KNEE: ICD-10-CM

## 2025-05-05 LAB — POCT GLUCOSE: 179 MG/DL (ref 70–110)

## 2025-05-05 PROCEDURE — 25000003 PHARM REV CODE 250

## 2025-05-05 PROCEDURE — 64999 UNLISTED PX NERVOUS SYSTEM: CPT | Performed by: STUDENT IN AN ORGANIZED HEALTH CARE EDUCATION/TRAINING PROGRAM

## 2025-05-05 PROCEDURE — 63600175 PHARM REV CODE 636 W HCPCS: Mod: JZ,TB | Performed by: STUDENT IN AN ORGANIZED HEALTH CARE EDUCATION/TRAINING PROGRAM

## 2025-05-05 PROCEDURE — 27201423 OPTIME MED/SURG SUP & DEVICES STERILE SUPPLY: Performed by: ORTHOPAEDIC SURGERY

## 2025-05-05 PROCEDURE — 0SRC069 REPLACEMENT OF RIGHT KNEE JOINT WITH OXIDIZED ZIRCONIUM ON POLYETHYLENE SYNTHETIC SUBSTITUTE, CEMENTED, OPEN APPROACH: ICD-10-PCS | Performed by: ORTHOPAEDIC SURGERY

## 2025-05-05 PROCEDURE — 25000003 PHARM REV CODE 250: Performed by: STUDENT IN AN ORGANIZED HEALTH CARE EDUCATION/TRAINING PROGRAM

## 2025-05-05 PROCEDURE — 63600175 PHARM REV CODE 636 W HCPCS

## 2025-05-05 PROCEDURE — C1776 JOINT DEVICE (IMPLANTABLE): HCPCS | Performed by: ORTHOPAEDIC SURGERY

## 2025-05-05 PROCEDURE — 8E0YXBZ COMPUTER ASSISTED PROCEDURE OF LOWER EXTREMITY: ICD-10-PCS | Performed by: ORTHOPAEDIC SURGERY

## 2025-05-05 PROCEDURE — 36000711: Performed by: ORTHOPAEDIC SURGERY

## 2025-05-05 PROCEDURE — 37000008 HC ANESTHESIA 1ST 15 MINUTES: Performed by: ORTHOPAEDIC SURGERY

## 2025-05-05 PROCEDURE — 97165 OT EVAL LOW COMPLEX 30 MIN: CPT

## 2025-05-05 PROCEDURE — 64447 NJX AA&/STRD FEMORAL NRV IMG: CPT | Performed by: STUDENT IN AN ORGANIZED HEALTH CARE EDUCATION/TRAINING PROGRAM

## 2025-05-05 PROCEDURE — C1713 ANCHOR/SCREW BN/BN,TIS/BN: HCPCS | Performed by: ORTHOPAEDIC SURGERY

## 2025-05-05 PROCEDURE — 94761 N-INVAS EAR/PLS OXIMETRY MLT: CPT

## 2025-05-05 PROCEDURE — 20985 CPTR-ASST DIR MS PX: CPT | Mod: ,,, | Performed by: ORTHOPAEDIC SURGERY

## 2025-05-05 PROCEDURE — 99900035 HC TECH TIME PER 15 MIN (STAT)

## 2025-05-05 PROCEDURE — 71000016 HC POSTOP RECOV ADDL HR: Performed by: ORTHOPAEDIC SURGERY

## 2025-05-05 PROCEDURE — 63600175 PHARM REV CODE 636 W HCPCS: Performed by: ORTHOPAEDIC SURGERY

## 2025-05-05 PROCEDURE — 36000710: Performed by: ORTHOPAEDIC SURGERY

## 2025-05-05 PROCEDURE — 71000033 HC RECOVERY, INTIAL HOUR: Performed by: ORTHOPAEDIC SURGERY

## 2025-05-05 PROCEDURE — 3E0R3BZ INTRODUCTION OF ANESTHETIC AGENT INTO SPINAL CANAL, PERCUTANEOUS APPROACH: ICD-10-PCS | Performed by: ORTHOPAEDIC SURGERY

## 2025-05-05 PROCEDURE — 71000039 HC RECOVERY, EACH ADD'L HOUR: Performed by: ORTHOPAEDIC SURGERY

## 2025-05-05 PROCEDURE — 71000015 HC POSTOP RECOV 1ST HR: Performed by: ORTHOPAEDIC SURGERY

## 2025-05-05 PROCEDURE — 37000009 HC ANESTHESIA EA ADD 15 MINS: Performed by: ORTHOPAEDIC SURGERY

## 2025-05-05 PROCEDURE — 27447 TOTAL KNEE ARTHROPLASTY: CPT | Mod: 58,RT,, | Performed by: ORTHOPAEDIC SURGERY

## 2025-05-05 PROCEDURE — 25000003 PHARM REV CODE 250: Performed by: ORTHOPAEDIC SURGERY

## 2025-05-05 PROCEDURE — 63600175 PHARM REV CODE 636 W HCPCS: Performed by: ANESTHESIOLOGY

## 2025-05-05 PROCEDURE — 97162 PT EVAL MOD COMPLEX 30 MIN: CPT

## 2025-05-05 DEVICE — TIBIAL BASE 3 METALBACKED
Type: IMPLANTABLE DEVICE | Site: KNEE | Status: FUNCTIONAL
Brand: FREEDOM KNEE

## 2025-05-05 DEVICE — CEMENT BONE SURG SMPLX P RADPQ: Type: IMPLANTABLE DEVICE | Site: KNEE | Status: FUNCTIONAL

## 2025-05-05 RX ORDER — POVIDONE-IODINE 10 %
SOLUTION, NON-ORAL TOPICAL
Status: DISCONTINUED | OUTPATIENT
Start: 2025-05-05 | End: 2025-05-05 | Stop reason: HOSPADM

## 2025-05-05 RX ORDER — LIDOCAINE HYDROCHLORIDE 20 MG/ML
INJECTION INTRAVENOUS
Status: DISCONTINUED | OUTPATIENT
Start: 2025-05-05 | End: 2025-05-05

## 2025-05-05 RX ORDER — PROPOFOL 10 MG/ML
VIAL (ML) INTRAVENOUS CONTINUOUS PRN
Status: DISCONTINUED | OUTPATIENT
Start: 2025-05-05 | End: 2025-05-05

## 2025-05-05 RX ORDER — DEXAMETHASONE SODIUM PHOSPHATE 4 MG/ML
INJECTION, SOLUTION INTRA-ARTICULAR; INTRALESIONAL; INTRAMUSCULAR; INTRAVENOUS; SOFT TISSUE
Status: DISCONTINUED | OUTPATIENT
Start: 2025-05-05 | End: 2025-05-05

## 2025-05-05 RX ORDER — CEFAZOLIN 2 G/1
2 INJECTION, POWDER, FOR SOLUTION INTRAMUSCULAR; INTRAVENOUS ONCE
Status: COMPLETED | OUTPATIENT
Start: 2025-05-05 | End: 2025-05-05

## 2025-05-05 RX ORDER — SODIUM CHLORIDE 0.9 % (FLUSH) 0.9 %
10 SYRINGE (ML) INJECTION
Status: DISCONTINUED | OUTPATIENT
Start: 2025-05-05 | End: 2025-05-08 | Stop reason: HOSPADM

## 2025-05-05 RX ORDER — HYDROMORPHONE HYDROCHLORIDE 2 MG/ML
0.5 INJECTION, SOLUTION INTRAMUSCULAR; INTRAVENOUS; SUBCUTANEOUS EVERY 5 MIN PRN
Status: DISCONTINUED | OUTPATIENT
Start: 2025-05-05 | End: 2025-05-05 | Stop reason: HOSPADM

## 2025-05-05 RX ORDER — ONDANSETRON HYDROCHLORIDE 2 MG/ML
INJECTION, SOLUTION INTRAVENOUS
Status: DISCONTINUED | OUTPATIENT
Start: 2025-05-05 | End: 2025-05-05

## 2025-05-05 RX ORDER — GLUCAGON 1 MG
1 KIT INJECTION
Status: DISCONTINUED | OUTPATIENT
Start: 2025-05-05 | End: 2025-05-08 | Stop reason: HOSPADM

## 2025-05-05 RX ORDER — BISACODYL 10 MG/1
10 SUPPOSITORY RECTAL DAILY PRN
Status: DISCONTINUED | OUTPATIENT
Start: 2025-05-05 | End: 2025-05-08 | Stop reason: HOSPADM

## 2025-05-05 RX ORDER — DEXMEDETOMIDINE HYDROCHLORIDE 100 UG/ML
INJECTION, SOLUTION INTRAVENOUS
Status: DISCONTINUED | OUTPATIENT
Start: 2025-05-05 | End: 2025-05-05

## 2025-05-05 RX ORDER — LIDOCAINE HYDROCHLORIDE 10 MG/ML
INJECTION, SOLUTION EPIDURAL; INFILTRATION; INTRACAUDAL; PERINEURAL
Status: COMPLETED | OUTPATIENT
Start: 2025-05-05 | End: 2025-05-05

## 2025-05-05 RX ORDER — TRANEXAMIC ACID 650 MG/1
650 TABLET ORAL 2 TIMES DAILY
Qty: 28 TABLET | Refills: 0 | Status: ON HOLD | OUTPATIENT
Start: 2025-05-05 | End: 2025-05-19

## 2025-05-05 RX ORDER — IBUPROFEN 200 MG
24 TABLET ORAL
Status: DISCONTINUED | OUTPATIENT
Start: 2025-05-05 | End: 2025-05-08 | Stop reason: HOSPADM

## 2025-05-05 RX ORDER — MUPIROCIN 20 MG/G
1 OINTMENT TOPICAL 2 TIMES DAILY
Status: DISCONTINUED | OUTPATIENT
Start: 2025-05-05 | End: 2025-05-08 | Stop reason: HOSPADM

## 2025-05-05 RX ORDER — PREGABALIN 75 MG/1
150 CAPSULE ORAL ONCE
Status: COMPLETED | OUTPATIENT
Start: 2025-05-05 | End: 2025-05-05

## 2025-05-05 RX ORDER — TALC
6 POWDER (GRAM) TOPICAL NIGHTLY PRN
Status: DISCONTINUED | OUTPATIENT
Start: 2025-05-05 | End: 2025-05-08 | Stop reason: HOSPADM

## 2025-05-05 RX ORDER — ATORVASTATIN CALCIUM 40 MG/1
40 TABLET, FILM COATED ORAL DAILY
Status: DISCONTINUED | OUTPATIENT
Start: 2025-05-06 | End: 2025-05-08 | Stop reason: HOSPADM

## 2025-05-05 RX ORDER — OXYCODONE HYDROCHLORIDE 5 MG/1
5 TABLET ORAL EVERY 6 HOURS PRN
Refills: 0 | Status: COMPLETED | OUTPATIENT
Start: 2025-05-05 | End: 2025-05-05

## 2025-05-05 RX ORDER — TRANEXAMIC ACID 1 G/10ML
INJECTION, SOLUTION INTRAVENOUS
Status: DISCONTINUED | OUTPATIENT
Start: 2025-05-05 | End: 2025-05-05 | Stop reason: HOSPADM

## 2025-05-05 RX ORDER — BUPIVACAINE HYDROCHLORIDE 2.5 MG/ML
INJECTION, SOLUTION EPIDURAL; INFILTRATION; INTRACAUDAL; PERINEURAL
Status: DISCONTINUED | OUTPATIENT
Start: 2025-05-05 | End: 2025-05-05

## 2025-05-05 RX ORDER — DICLOFENAC SODIUM 75 MG/1
75 TABLET, DELAYED RELEASE ORAL 2 TIMES DAILY
Qty: 84 TABLET | Refills: 0 | Status: ON HOLD | OUTPATIENT
Start: 2025-05-05 | End: 2025-06-16

## 2025-05-05 RX ORDER — FAMOTIDINE 20 MG/1
20 TABLET, FILM COATED ORAL 2 TIMES DAILY
Status: DISCONTINUED | OUTPATIENT
Start: 2025-05-05 | End: 2025-05-08 | Stop reason: HOSPADM

## 2025-05-05 RX ORDER — FAMOTIDINE 20 MG/1
20 TABLET, FILM COATED ORAL 2 TIMES DAILY
Qty: 84 TABLET | Refills: 0 | Status: ON HOLD | OUTPATIENT
Start: 2025-05-05 | End: 2025-06-16

## 2025-05-05 RX ORDER — CEPHALEXIN 500 MG/1
500 CAPSULE ORAL EVERY 6 HOURS
Qty: 4 CAPSULE | Refills: 0 | Status: SHIPPED | OUTPATIENT
Start: 2025-05-05 | End: 2025-05-06

## 2025-05-05 RX ORDER — BUPIVACAINE 13.3 MG/ML
INJECTION, SUSPENSION, LIPOSOMAL INFILTRATION
Status: DISCONTINUED | OUTPATIENT
Start: 2025-05-05 | End: 2025-05-05

## 2025-05-05 RX ORDER — VASOPRESSIN 20 [USP'U]/ML
INJECTION, SOLUTION INTRAMUSCULAR; SUBCUTANEOUS
Status: DISCONTINUED | OUTPATIENT
Start: 2025-05-05 | End: 2025-05-05

## 2025-05-05 RX ORDER — OXYCODONE HYDROCHLORIDE 5 MG/1
5 TABLET ORAL EVERY 6 HOURS PRN
Refills: 0 | Status: DISCONTINUED | OUTPATIENT
Start: 2025-05-05 | End: 2025-05-05

## 2025-05-05 RX ORDER — ACETAMINOPHEN 325 MG/1
650 TABLET ORAL EVERY 6 HOURS SCHEDULED
Status: DISCONTINUED | OUTPATIENT
Start: 2025-05-05 | End: 2025-05-08 | Stop reason: HOSPADM

## 2025-05-05 RX ORDER — PHENYLEPHRINE HYDROCHLORIDE 10 MG/ML
INJECTION INTRAVENOUS
Status: DISCONTINUED | OUTPATIENT
Start: 2025-05-05 | End: 2025-05-05

## 2025-05-05 RX ORDER — CELECOXIB 100 MG/1
400 CAPSULE ORAL ONCE
Status: COMPLETED | OUTPATIENT
Start: 2025-05-05 | End: 2025-05-05

## 2025-05-05 RX ORDER — SODIUM CHLORIDE 0.9 % (FLUSH) 0.9 %
10 SYRINGE (ML) INJECTION
Status: DISCONTINUED | OUTPATIENT
Start: 2025-05-05 | End: 2025-05-05 | Stop reason: HOSPADM

## 2025-05-05 RX ORDER — ONDANSETRON HYDROCHLORIDE 2 MG/ML
4 INJECTION, SOLUTION INTRAVENOUS ONCE AS NEEDED
Status: COMPLETED | OUTPATIENT
Start: 2025-05-05 | End: 2025-05-05

## 2025-05-05 RX ORDER — ONDANSETRON HYDROCHLORIDE 2 MG/ML
4 INJECTION, SOLUTION INTRAVENOUS EVERY 12 HOURS PRN
Status: DISCONTINUED | OUTPATIENT
Start: 2025-05-05 | End: 2025-05-08 | Stop reason: HOSPADM

## 2025-05-05 RX ORDER — TRANEXAMIC ACID 1 G/10ML
1000 INJECTION, SOLUTION INTRAVENOUS ONCE
Status: DISCONTINUED | OUTPATIENT
Start: 2025-05-05 | End: 2025-05-05

## 2025-05-05 RX ORDER — HYDROCHLOROTHIAZIDE 12.5 MG/1
12.5 TABLET ORAL DAILY
Status: DISCONTINUED | OUTPATIENT
Start: 2025-05-06 | End: 2025-05-07

## 2025-05-05 RX ORDER — AMOXICILLIN 250 MG
1 CAPSULE ORAL 2 TIMES DAILY
Status: DISCONTINUED | OUTPATIENT
Start: 2025-05-05 | End: 2025-05-08 | Stop reason: HOSPADM

## 2025-05-05 RX ORDER — SODIUM CHLORIDE 9 MG/ML
INJECTION, SOLUTION INTRAVENOUS CONTINUOUS
Status: DISCONTINUED | OUTPATIENT
Start: 2025-05-05 | End: 2025-05-06

## 2025-05-05 RX ORDER — OXYCODONE AND ACETAMINOPHEN 10; 325 MG/1; MG/1
1 TABLET ORAL ONCE
Refills: 0 | Status: DISCONTINUED | OUTPATIENT
Start: 2025-05-05 | End: 2025-05-05

## 2025-05-05 RX ORDER — LISINOPRIL 10 MG/1
10 TABLET ORAL DAILY
Status: DISCONTINUED | OUTPATIENT
Start: 2025-05-06 | End: 2025-05-07

## 2025-05-05 RX ORDER — TRANEXAMIC ACID 1 G/10ML
1000 INJECTION, SOLUTION INTRAVENOUS ONCE
Status: COMPLETED | OUTPATIENT
Start: 2025-05-05 | End: 2025-05-05

## 2025-05-05 RX ORDER — NAPROXEN SODIUM 220 MG/1
81 TABLET, FILM COATED ORAL 2 TIMES DAILY
Status: DISCONTINUED | OUTPATIENT
Start: 2025-05-05 | End: 2025-05-08 | Stop reason: HOSPADM

## 2025-05-05 RX ORDER — TRANEXAMIC ACID 650 MG/1
1950 TABLET ORAL ONCE
Status: COMPLETED | OUTPATIENT
Start: 2025-05-05 | End: 2025-05-05

## 2025-05-05 RX ORDER — BUPIVACAINE HYDROCHLORIDE 2.5 MG/ML
INJECTION, SOLUTION INFILTRATION; PERINEURAL
Status: DISCONTINUED | OUTPATIENT
Start: 2025-05-05 | End: 2025-05-05 | Stop reason: HOSPADM

## 2025-05-05 RX ORDER — ACETAMINOPHEN 500 MG
1000 TABLET ORAL ONCE
Status: COMPLETED | OUTPATIENT
Start: 2025-05-05 | End: 2025-05-05

## 2025-05-05 RX ORDER — ACETAMINOPHEN 325 MG/1
325 TABLET ORAL EVERY 4 HOURS
Qty: 84 TABLET | Refills: 0 | Status: ON HOLD | OUTPATIENT
Start: 2025-05-05 | End: 2025-05-19

## 2025-05-05 RX ORDER — IBUPROFEN 200 MG
16 TABLET ORAL
Status: DISCONTINUED | OUTPATIENT
Start: 2025-05-05 | End: 2025-05-08 | Stop reason: HOSPADM

## 2025-05-05 RX ORDER — ASPIRIN 81 MG/1
81 TABLET ORAL 2 TIMES DAILY
Qty: 84 TABLET | Refills: 0 | Status: ON HOLD | OUTPATIENT
Start: 2025-05-05 | End: 2025-06-16

## 2025-05-05 RX ORDER — TRANEXAMIC ACID 10 MG/ML
1000 INJECTION, SOLUTION INTRAVENOUS ONCE
Status: COMPLETED | OUTPATIENT
Start: 2025-05-05 | End: 2025-05-05

## 2025-05-05 RX ORDER — TIMOLOL MALEATE 5 MG/ML
1 SOLUTION/ DROPS OPHTHALMIC 2 TIMES DAILY
Status: DISCONTINUED | OUTPATIENT
Start: 2025-05-05 | End: 2025-05-07

## 2025-05-05 RX ORDER — CELECOXIB 100 MG/1
200 CAPSULE ORAL 2 TIMES DAILY
Status: DISCONTINUED | OUTPATIENT
Start: 2025-05-05 | End: 2025-05-08 | Stop reason: HOSPADM

## 2025-05-05 RX ORDER — PREGABALIN 75 MG/1
75 CAPSULE ORAL 2 TIMES DAILY
Status: DISCONTINUED | OUTPATIENT
Start: 2025-05-05 | End: 2025-05-08

## 2025-05-05 RX ORDER — CEFAZOLIN 2 G/1
2 INJECTION, POWDER, FOR SOLUTION INTRAMUSCULAR; INTRAVENOUS
Status: COMPLETED | OUTPATIENT
Start: 2025-05-05 | End: 2025-05-06

## 2025-05-05 RX ORDER — PROPOFOL 10 MG/ML
VIAL (ML) INTRAVENOUS
Status: DISCONTINUED | OUTPATIENT
Start: 2025-05-05 | End: 2025-05-05

## 2025-05-05 RX ORDER — LIDOCAINE HYDROCHLORIDE 10 MG/ML
1 INJECTION, SOLUTION EPIDURAL; INFILTRATION; INTRACAUDAL; PERINEURAL ONCE
Status: DISCONTINUED | OUTPATIENT
Start: 2025-05-05 | End: 2025-05-05 | Stop reason: HOSPADM

## 2025-05-05 RX ORDER — INSULIN ASPART 100 [IU]/ML
0-5 INJECTION, SOLUTION INTRAVENOUS; SUBCUTANEOUS
Status: DISCONTINUED | OUTPATIENT
Start: 2025-05-05 | End: 2025-05-08 | Stop reason: HOSPADM

## 2025-05-05 RX ORDER — GLUCAGON 1 MG
1 KIT INJECTION
Status: DISCONTINUED | OUTPATIENT
Start: 2025-05-05 | End: 2025-05-05 | Stop reason: HOSPADM

## 2025-05-05 RX ORDER — DEXAMETHASONE SODIUM PHOSPHATE 4 MG/ML
10 INJECTION, SOLUTION INTRA-ARTICULAR; INTRALESIONAL; INTRAMUSCULAR; INTRAVENOUS; SOFT TISSUE ONCE
Status: DISCONTINUED | OUTPATIENT
Start: 2025-05-05 | End: 2025-05-05 | Stop reason: HOSPADM

## 2025-05-05 RX ORDER — NAPROXEN SODIUM 220 MG/1
81 TABLET, FILM COATED ORAL 2 TIMES DAILY
Status: DISCONTINUED | OUTPATIENT
Start: 2025-05-05 | End: 2025-05-05

## 2025-05-05 RX ORDER — BRIMONIDINE TARTRATE 2 MG/ML
1 SOLUTION/ DROPS OPHTHALMIC EVERY 12 HOURS
Status: DISCONTINUED | OUTPATIENT
Start: 2025-05-05 | End: 2025-05-07

## 2025-05-05 RX ADMIN — MEPIVACAINE HYDROCHLORIDE 3 ML: 15 INJECTION, SOLUTION EPIDURAL; INFILTRATION at 12:05

## 2025-05-05 RX ADMIN — TRANEXAMIC ACID 1000 MG: 100 INJECTION, SOLUTION INTRAVENOUS at 01:05

## 2025-05-05 RX ADMIN — PHENYLEPHRINE HYDROCHLORIDE 200 MCG: 10 INJECTION INTRAVENOUS at 01:05

## 2025-05-05 RX ADMIN — TRANEXAMIC ACID 1950 MG: 650 TABLET ORAL at 10:05

## 2025-05-05 RX ADMIN — CELECOXIB 400 MG: 100 CAPSULE ORAL at 10:05

## 2025-05-05 RX ADMIN — PHENYLEPHRINE HYDROCHLORIDE 100 MCG: 10 INJECTION INTRAVENOUS at 01:05

## 2025-05-05 RX ADMIN — VASOPRESSIN 1 UNITS: 20 INJECTION INTRAVENOUS at 01:05

## 2025-05-05 RX ADMIN — LIDOCAINE HYDROCHLORIDE 60 MG: 20 INJECTION, SOLUTION INTRAVENOUS at 12:05

## 2025-05-05 RX ADMIN — SODIUM CHLORIDE: 0.9 INJECTION, SOLUTION INTRAVENOUS at 12:05

## 2025-05-05 RX ADMIN — OXYCODONE 5 MG: 5 TABLET ORAL at 03:05

## 2025-05-05 RX ADMIN — VASOPRESSIN 0.5 UNITS: 20 INJECTION INTRAVENOUS at 01:05

## 2025-05-05 RX ADMIN — ONDANSETRON 4 MG: 2 INJECTION, SOLUTION INTRAMUSCULAR; INTRAVENOUS at 12:05

## 2025-05-05 RX ADMIN — ACETAMINOPHEN 650 MG: 325 TABLET ORAL at 11:05

## 2025-05-05 RX ADMIN — BUPIVACAINE 10 ML: 13.3 INJECTION, SUSPENSION, LIPOSOMAL INFILTRATION at 02:05

## 2025-05-05 RX ADMIN — PHENYLEPHRINE HYDROCHLORIDE 100 MCG: 10 INJECTION INTRAVENOUS at 12:05

## 2025-05-05 RX ADMIN — CEFAZOLIN 2 G: 2 INJECTION, POWDER, FOR SOLUTION INTRAMUSCULAR; INTRAVENOUS at 12:05

## 2025-05-05 RX ADMIN — PREGABALIN 75 MG: 75 CAPSULE ORAL at 09:05

## 2025-05-05 RX ADMIN — CELECOXIB 200 MG: 100 CAPSULE ORAL at 09:05

## 2025-05-05 RX ADMIN — PROPOFOL 100 MCG/KG/MIN: 10 INJECTION, EMULSION INTRAVENOUS at 12:05

## 2025-05-05 RX ADMIN — ACETAMINOPHEN 650 MG: 325 TABLET ORAL at 06:05

## 2025-05-05 RX ADMIN — DEXMEDETOMIDINE 8 MCG: 200 INJECTION, SOLUTION INTRAVENOUS at 12:05

## 2025-05-05 RX ADMIN — PROPOFOL 10 MG: 10 INJECTION, EMULSION INTRAVENOUS at 12:05

## 2025-05-05 RX ADMIN — FAMOTIDINE 20 MG: 20 TABLET, FILM COATED ORAL at 09:05

## 2025-05-05 RX ADMIN — DEXAMETHASONE SODIUM PHOSPHATE 10 MG: 4 INJECTION, SOLUTION INTRA-ARTICULAR; INTRALESIONAL; INTRAMUSCULAR; INTRAVENOUS; SOFT TISSUE at 12:05

## 2025-05-05 RX ADMIN — MUPIROCIN 1 G: 20 OINTMENT TOPICAL at 09:05

## 2025-05-05 RX ADMIN — BUPIVACAINE HYDROCHLORIDE 15 ML: 2.5 INJECTION, SOLUTION EPIDURAL; INFILTRATION; INTRACAUDAL; PERINEURAL at 02:05

## 2025-05-05 RX ADMIN — TIMOLOL MALEATE 1 DROP: 5 SOLUTION OPHTHALMIC at 09:05

## 2025-05-05 RX ADMIN — ACETAMINOPHEN 1000 MG: 500 TABLET ORAL at 10:05

## 2025-05-05 RX ADMIN — SODIUM CHLORIDE: 9 INJECTION, SOLUTION INTRAVENOUS at 06:05

## 2025-05-05 RX ADMIN — PREGABALIN 150 MG: 75 CAPSULE ORAL at 10:05

## 2025-05-05 RX ADMIN — PHENYLEPHRINE HYDROCHLORIDE 50 MCG: 10 INJECTION INTRAVENOUS at 12:05

## 2025-05-05 RX ADMIN — TRANEXAMIC ACID 1000 MG: 10 INJECTION, SOLUTION INTRAVENOUS at 02:05

## 2025-05-05 RX ADMIN — SENNOSIDES AND DOCUSATE SODIUM 1 TABLET: 50; 8.6 TABLET ORAL at 09:05

## 2025-05-05 RX ADMIN — BRIMONIDINE TARTRATE 1 DROP: 2 SOLUTION OPHTHALMIC at 09:05

## 2025-05-05 RX ADMIN — Medication 6 MG: at 09:05

## 2025-05-05 RX ADMIN — CEFAZOLIN 2 G: 2 INJECTION, POWDER, FOR SOLUTION INTRAMUSCULAR; INTRAVENOUS at 09:05

## 2025-05-05 RX ADMIN — LIDOCAINE HYDROCHLORIDE 30 MG: 10 INJECTION, SOLUTION EPIDURAL; INFILTRATION; INTRACAUDAL; PERINEURAL at 12:05

## 2025-05-05 RX ADMIN — PROPOFOL 50 MG: 10 INJECTION, EMULSION INTRAVENOUS at 12:05

## 2025-05-05 RX ADMIN — ONDANSETRON 4 MG: 2 INJECTION INTRAMUSCULAR; INTRAVENOUS at 03:05

## 2025-05-05 NOTE — CONSULTS
Consult note  [facility]    Consulted by: [facility]  Reason for Consult: Medical management    History of Present Illness:  Patient is a 75 y.o. female presents after R  knee arthroplasty. Unable to tolerate PT due to drowsiness. Patient without acute complaints at this time.    PTA Medications   Medication Sig    atorvastatin (LIPITOR) 40 MG tablet Take 40 mg by mouth once daily.    cetirizine (ZYRTEC) 10 MG tablet Take 10 mg by mouth once daily.    hydroCHLOROthiazide 12.5 MG Tab Take 12.5 mg by mouth once daily.    lisinopriL 10 MG tablet Take 10 mg by mouth once daily.    metformin (GLUCOPHAGE) 500 MG tablet Take 500 mg by mouth daily with breakfast.    triamterene-hydrochlorothiazide 37.5-25 mg (DYAZIDE) 37.5-25 mg per capsule Take 1 capsule by mouth every morning.    calcium carbonate (OS-PARAS) 600 mg calcium (1,500 mg) Tab Take 600 mg by mouth 2 (two) times daily with meals.    meclizine (ANTIVERT) 25 mg tablet Take 1 tablet (25 mg total) by mouth 3 (three) times daily as needed.    MULTIVIT-MIN/FA/CALCIUM/VIT K1 (ONE-A-DAY WOMEN'S 50+ ORAL) Take by mouth.       Review of patient's allergies indicates:   Allergen Reactions    Asa [aspirin]     Pcn [penicillins]     Codeine Nausea Only     Reaction to codeine in codeine in cough medicine. Nausea only.  Patient can tolerate Percocet.       Past Medical History:   Diagnosis Date    High cholesterol     Hypertension     Macular degeneration      Past Surgical History:   Procedure Laterality Date    HYSTERECTOMY       No family history on file.  Social History[1]     Review of Systems:   Review of Systems   Constitutional:  Negative for chills and fever.   Respiratory:  Negative for shortness of breath.    Cardiovascular:  Negative for chest pain and palpitations.      OBJECTIVE:     Vital Signs (Most Recent)  Temp: 97.9 °F (36.6 °C) (05/05/25 1754)  Pulse: 83 (05/05/25 1754)  Resp: 18 (05/05/25 1754)  BP: 104/65 (05/05/25 1754)  SpO2: 96 % (05/05/25  7250)    Physical Exam:  Gen- WNWD, NAD  CV- RRR, no LE edema  Resp- breathing comfortably on RA, CTAB  Abd- soft, NTND  Skin- R knee Surgical incision dressing cdi  Psych- logical thought process, answers questions appropriately     Laboratory  Lab Results   Component Value Date    WBC 5.56 03/20/2025    HGB 12.1 03/20/2025    HCT 38.8 03/20/2025    MCV 97 03/20/2025     03/20/2025      CMP  Sodium   Date Value Ref Range Status   03/20/2025 141 136 - 145 mmol/L Final     Potassium   Date Value Ref Range Status   03/20/2025 3.9 3.5 - 5.1 mmol/L Final     Chloride   Date Value Ref Range Status   03/20/2025 106 95 - 110 mmol/L Final     CO2   Date Value Ref Range Status   03/20/2025 24 23 - 29 mmol/L Final     Glucose   Date Value Ref Range Status   03/20/2025 86 70 - 110 mg/dL Final     BUN   Date Value Ref Range Status   03/20/2025 20 8 - 23 mg/dL Final     Creatinine   Date Value Ref Range Status   03/20/2025 0.8 0.5 - 1.4 mg/dL Final     Calcium   Date Value Ref Range Status   03/20/2025 10.2 8.7 - 10.5 mg/dL Final     Total Protein   Date Value Ref Range Status   03/20/2025 8.5 (H) 6.0 - 8.4 g/dL Final     Albumin   Date Value Ref Range Status   03/20/2025 4.0 3.5 - 5.2 g/dL Final   03/20/2025 4.0 3.5 - 5.2 g/dL Final     Total Bilirubin   Date Value Ref Range Status   03/20/2025 0.8 0.1 - 1.0 mg/dL Final     Comment:     For infants and newborns, interpretation of results should be based  on gestational age, weight and in agreement with clinical  observations.    Premature Infant recommended reference ranges:  Up to 24 hours.............<8.0 mg/dL  Up to 48 hours............<12.0 mg/dL  3-5 days..................<15.0 mg/dL  6-29 days.................<15.0 mg/dL       Alkaline Phosphatase   Date Value Ref Range Status   03/20/2025 78 40 - 150 U/L Final     AST   Date Value Ref Range Status   03/20/2025 19 10 - 40 U/L Final     ALT   Date Value Ref Range Status   03/20/2025 13 10 - 44 U/L Final     Anion  "Gap   Date Value Ref Range Status   03/20/2025 11 8 - 16 mmol/L Final        No results found for: "INR", "PROTIME"   No results for input(s): "CPK", "CPKMB", "TROPONINI", "MB" in the last 168 hours.   No results for input(s): "TROPONINI", "CKTOTAL", "CKMB" in the last 168 hours.    BNP  No results for input(s): "BNP" in the last 168 hours.    Urinalysis  No results for input(s): "COLORU", "CLARITYU", "SPECGRAV", "PHUR", "PROTEINUA", "GLUCOSEU", "BILIRUBINCON", "BLOODU", "WBCU", "RBCU", "BACTERIA", "MUCUS", "NITRITE", "LEUKOCYTESUR", "UROBILINOGEN", "HYALINECASTS" in the last 24 hours.   LAST HbA1c  Lab Results   Component Value Date    HGBA1C 6.1 (H) 02/06/2025             ASSESSMENT/PLAN:   75 y.o.female has a past medical history of High cholesterol, Hypertension, and Macular degeneration. here for R knee arthroplasty. Drowsiness likely secondary to anesthesia from procedure. Will reassess in the morning.     HTN  Continue home lisinopril 10mg and HCTZ 12.5mg daily in AM if not hypotensive.    Macular degeneration  Continue home Combigan and Lumigan eye drops.     HLD  Continue home atorvastatin 40mg daily    T2DM  Hold home metformin  - Accuchecks with [facility]    Family medicine will continue to follow. Please contact us if you have any further questions. Thank you for the consult.     Diane Muñoz MD  [facility], PGY-1  05/05/2025          [1]   Social History  Tobacco Use    Smoking status: Never   Substance Use Topics    Alcohol use: No    Drug use: No     "

## 2025-05-05 NOTE — PT/OT/SLP EVAL
Occupational Therapy   Evaluation    Name: Luli Triplett  MRN: [id number]  Admitting Diagnosis: <principal problem not specified>  Recent Surgery: Procedure(s) (LRB):  ARTHROPLASTY, KNEE, SIGHT ASSISTED (Right) * Day of Surgery *    Recommendations:     Discharge Recommendations:  (TBD)  Discharge Equipment Recommendations:  other (see comments) (TBD)  Barriers to discharge:  Other (Comment), Inaccessible home environment (Pt requires increased level of assist)    Assessment:     Luli Triplett is a 75 y.o. female with a medical diagnosis of <principal problem not specified>.  She presents with The primary encounter diagnosis was Primary osteoarthritis of right knee. A diagnosis of Arthritis of right knee was also pertinent to this visit. Performance deficits affecting function: weakness, impaired functional mobility, decreased safety awareness, impaired endurance, gait instability, impaired balance, impaired self care skills, decreased lower extremity function, decreased ROM, orthopedic precautions, impaired skin, pain, impaired joint extensibility.      Rehab Prognosis: Good; patient would benefit from acute skilled OT services to address these deficits and reach maximum level of function.       Plan:     Patient to be seen 5 x/week to address the above listed problems via self-care/home management, therapeutic activities, therapeutic exercises  Plan of Care Expires: 06/05/25  Plan of Care Reviewed with: patient, sibling    Subjective     Chief Complaint: drowsiness  Patient/Family Comments/goals: agreeable to overnight stay    Occupational Profile:  Living Environment: lives w/sister, raised home, 11STE w/RHR, WIS w/WC  Previous level of function: Ishmael with use of RW, no falls since Jan 2025  Roles and Routines: drives  Equipment Used at Home: walker, rolling, shower chair, bedside commode  Assistance upon Discharge: sister    Pain/Comfort:  Pain Rating 1: 4/10  Location - Side 1: Right  Location 1:  "knee  Pain Addressed 1: Nurse notified  Pain Rating Post-Intervention 1: 4/10    Patients cultural, spiritual, [employer]ology conflicts given the current situation: no    Objective:     Communicated with: nsg prior to session.  Patient found HOB elevated with telemetry, blood pressure cuff, peripheral IV upon OT entry to room.    General Precautions: Standard, fall  Orthopedic Precautions: RLE weight bearing as tolerated  Braces: N/A  Respiratory Status: Room air    Occupational Performance:    Cognitive/Visual Perceptual:  Cognitive/Psychosocial Skills:     -       Oriented to: Person, Place, Time, and Situation   -       Follows Commands/attention:Follows one-step commands and falling asleep  -       Safety awareness/insight to disability: impaired   -       Mood/Affect/Coping skills/emotional control: Lethargic    AMPAC 6 Click ADL:  AMPAC Total Score: 15    Treatment & Education:  Pt would benefit from cont OT services in order to maximize functional independence.    Initiated evaluation this date.   Pt drowsy & unable to keep eyes open.   Pt reports decreased sensation RLE, unable to perform straight leg raise at this time.   BP in supine 102/58.   Pt reports she feels like her head "is in two places at once".   Would recommend pt stay overnight for continued therapy next tx date.   Pt educated on exs, ice & positioning until next tx session.  Will progress as able.     Patient left HOB elevated with all lines intact, call button in reach, nsg notified, and sister present    GOALS:   Multidisciplinary Problems       Occupational Therapy Goals          Problem: Occupational Therapy    Goal Priority Disciplines Outcome Interventions   Occupational Therapy Goal     OT, PT/OT Progressing    Description: Goals to be met by: 06/05/2025     Patient will increase functional independence with ADLs by performing:    Toileting from toilet with Stand-by Assistance for hygiene and clothing management.   Supine to sit with " Stand-by Assistance.  Step transfer with Contact Guard Assistance  Toilet transfer to toilet with Contact Guard Assistance.                         History:     Past Medical History:   Diagnosis Date    High cholesterol     Hypertension     Macular degeneration          Past Surgical History:   Procedure Laterality Date    HYSTERECTOMY         Time Tracking:     OT Date of Treatment: 05/05/25  OT Start Time: 1639  OT Stop Time: 1649  OT Total Time (min): 10 min    Billable Minutes:Evaluation 10    5/5/2025

## 2025-05-05 NOTE — ANESTHESIA PROCEDURE NOTES
Spinal    Diagnosis: Right knee osteoarthritis  Patient location during procedure: OR  Start time: 5/5/2025 12:10 PM  Timeout: 5/5/2025 12:06 PM  End time: 5/5/2025 12:10 PM    Staffing  Authorizing Provider: López Wilkes MD  Performing Provider: López Wilkes MD    Staffing  Performed by: óLpez Wilkes MD  Authorized by: López Wilkes MD    Preanesthetic Checklist  Completed: patient identified, IV checked, site marked, risks and benefits discussed, surgical consent, monitors and equipment checked, pre-op evaluation and timeout performed  Spinal Block  Patient position: sitting  Prep: ChloraPrep  Patient monitoring: heart rate, continuous pulse ox and frequent blood pressure checks  Approach: midline  Location: L3-4  Injection technique: single shot  CSF Fluid: clear free-flowing CSF  Needle  Needle type: pencil-tip   Needle gauge: 24 G  Needle length: 4 in  Additional Documentation: incremental injection, negative aspiration for heme and no paresthesia on injection  Needle localization: anatomical landmarks  Assessment  Sensory level: T8   Dermatomal levels determined by pinch or prick  Ease of block: easy  Patient's tolerance of the procedure: comfortable throughout block  Medications:    Medications: lidocaine (PF) injection 1% - Other   30 mg - 5/5/2025 12:10:00 PM  mepivacaine (CARBOCAINE) injection 15 mg/mL (1.5%) - Other   3 mL - 5/5/2025 12:12:00 PM

## 2025-05-05 NOTE — ANESTHESIA POSTPROCEDURE EVALUATION
Anesthesia Post Evaluation    Patient: Luli Triplett    Procedure(s) Performed: Procedure(s) (LRB):  ARTHROPLASTY, KNEE, SIGHT ASSISTED (Right)    Final Anesthesia Type: spinal      Patient location during evaluation: PACU  Patient participation: Yes- Able to Participate  Level of consciousness: awake  Post-procedure vital signs: reviewed and stable  Pain management: adequate  Airway patency: patent    PONV status at discharge: No PONV  Anesthetic complications: no      Cardiovascular status: blood pressure returned to baseline  Respiratory status: unassisted  Hydration status: euvolemic  Follow-up not needed.              Vitals Value Taken Time   /61 05/05/25 17:00   Temp 36.6 °C (97.9 °F) 05/05/25 16:30   Pulse 77 05/05/25 17:00   Resp 17 05/05/25 17:00   SpO2 100 % 05/05/25 17:00         Event Time   Out of Recovery 14:58:18         Pain/Osmani Score: Pain Rating Prior to Med Admin: 5 (5/5/2025  3:43 PM)  Osmani Score: 10 (5/5/2025  4:30 PM)

## 2025-05-05 NOTE — PLAN OF CARE
Problem: Physical Therapy  Goal: Physical Therapy Goal  Description: Goals to be met by: 25     Patient will increase functional independence with mobility by performin. Supine to sit with Stand-by Assistance  2. Sit to supine with Stand-by Assistance  3. Sit to stand transfer with Stand-by Assistance with use of RW.  4. Bed to chair transfer with Stand-by Assistance using Rolling Walker  5. Gait  x 50 feet with Stand-by Assistance using Rolling Walker.   6. Ascend/descend 5 stair with right Handrails Minimal Assistance using Rolling Walker.     Outcome: Progressing     PT/OT co-evaluation completed due to pt's first time OOB post surgery. Pt unable to complete OOB mobility due to fatigue/drowsiness, pain and lower BP; poor sensation to RLE. Pt able to participates in bed level BLE MMT/ROM/sensation testing. PT recommending overnight stay with further progress tomorrow AM.

## 2025-05-05 NOTE — INTERVAL H&P NOTE
The patient has been examined and the H&P has been reviewed:    I concur with the findings and no changes have occurred since H&P was written.    Surgery risks, benefits and alternative options discussed and understood by patient/family.          Active Hospital Problems    Diagnosis  POA    Primary osteoarthritis of right knee [M17.11]  Yes      Resolved Hospital Problems   No resolved problems to display.

## 2025-05-05 NOTE — ANESTHESIA PROCEDURE NOTES
Right IPACK Peripheral Block    Patient location during procedure: post-op   Block not for primary anesthetic.  Reason for block: at surgeon's request and post-op pain management   Post-op Pain Location: Right knee   Start time: 5/5/2025 2:18 PM  Timeout: 5/5/2025 2:14 PM   End time: 5/5/2025 2:26 PM    Staffing  Authorizing Provider: López Wilkes MD  Performing Provider: López Wilkes MD    Staffing  Performed by: López Wilkes MD  Authorized by: López Wilkes MD    Preanesthetic Checklist  Completed: patient identified, IV checked, site marked, risks and benefits discussed, surgical consent, monitors and equipment checked, pre-op evaluation and timeout performed  Peripheral Block  Patient position: supine  Prep: ChloraPrep  Patient monitoring: heart rate, cardiac monitor, continuous pulse ox, continuous capnometry and frequent blood pressure checks  Block type: I PACK  Laterality: right  Injection technique: single shot  Needle  Needle type: Stimuplex   Needle gauge: 20 G  Needle length: 4 in  Needle localization: anatomical landmarks and ultrasound guidance   -ultrasound image captured on disc.  Assessment  Injection assessment: negative aspiration, negative parasthesia and local visualized surrounding nerve  Paresthesia pain: none  Heart rate change: no  Slow fractionated injection: yes    Medications:    Medications: bupivacaine (pf) (MARCAINE) injection 0.25% - Perineural   15 mL - 5/5/2025 2:25:00 PM    Additional Notes  VSS.  PACU RN monitoring vitals throughout procedure.  Patient tolerated procedure well.    With epinephrine 1:200,000.

## 2025-05-05 NOTE — PLAN OF CARE
"Pt would benefit from cont OT services in order to maximize functional independence. Recommending TBD pending progress. Initiated evaluation this date. Pt drowsy & unable to keep eyes open. Pt reports decreased sensation RLE, unable to perform straight leg raise at this time. BP in supine 102/58. Pt reports she feels like her head "is in two places at once". Would recommend pt stay overnight for continued therapy next tx date. Will progress as able.     Problem: Occupational Therapy  Goal: Occupational Therapy Goal  Description: Goals to be met by: 06/05/2025     Patient will increase functional independence with ADLs by performing:    Toileting from toilet with Stand-by Assistance for hygiene and clothing management.   Supine to sit with Stand-by Assistance.  Step transfer with Contact Guard Assistance  Toilet transfer to toilet with Contact Guard Assistance.    Outcome: Progressing     "

## 2025-05-05 NOTE — TRANSFER OF CARE
"Anesthesia Transfer of Care Note    Patient: Luli Triplett    Procedure(s) Performed: Procedure(s) (LRB):  ARTHROPLASTY, KNEE, SIGHT ASSISTED (Right)    Patient location: PACU    Anesthesia Type: general    Transport from OR: Transported from OR on 6-10 L/min O2 by face mask with adequate spontaneous ventilation    Post pain: adequate analgesia    Post assessment: no apparent anesthetic complications and tolerated procedure well    Post vital signs: stable    Level of consciousness: awake and responds to stimulation    Nausea/Vomiting: no nausea/vomiting    Complications: none    Transfer of care protocol was followed      Last vitals: Visit Vitals  BP (!) 109/77 (BP Location: Right arm, Patient Position: Lying)   Pulse 72   Temp 36.6 °C (97.9 °F) (Skin)   Resp 20   Ht 5' 2" (1.575 m)   Wt 91.2 kg (201 lb)   SpO2 100%   Breastfeeding No   BMI 36.76 kg/m²     "
2024

## 2025-05-05 NOTE — OP NOTE
DATE OF PROCEDURE:5/5/2025     PREOPERATIVE DIAGNOSIS: right knee bone-on-bone osteoarthritis.     POSTOPERATIVE DIAGNOSIS: right knee bone-on-bone osteoarthritis.     PROCEDURE: Image less Computer-assisted right total knee arthroplasty with resurfaced patella.     ATTENDING SURGEON: Amos Newman M.D.   ASSISTANT: Dr. Aguirre     Risk Adjustment: Please see media tab for any additional diagnoses faxed from my office related to theTKA.    COMPLICATIONS: None.     Estimated Blood loss: <100cc    IMPLANTS:   1. MARCOS ortho tibial tray size 3   2. MARCOS ortho tibial insert, UC 10  mm height.   2. MARCOS OrthoFemoral component, size  C  right  3. MARCOS Ortho all-poly patella, size 31    4. bone cement x2     INDICATIONS FOR PROCEDURE: This is a 75 y.o. female with longstanding knee pain. They have failed nonoperative management including injections. Risks and benefits of total knee arthroplasty were explained to the patient. The patient agreed to move forward with total knee arthroplasty.     FINDINGS: The patient had a complete articular cartilage loss down to subchondral bone throughout the knee.     PROCEDURE IN DETAIL: The patient had adductor nerve block prior to entry to the OR. The patient was then brought to the Operating Room and spinal anesthesia started without any difficulties. The patient was placed supine on the operative table.  Knee was then prepped and draped in sterile fashion. Prior to incision, proper site and procedure as well as antibiotic administration was verified. AFCN and ISN nerves were then injected with marcaine. Anterior midline incision was created overlying center patella proximally to the medial border of the tibial tubercle distally. Skin, subcutaneous fat and deep fascial layer were sharply incised until we came to extensor mechanism retinaculum. Flap was then elevated medially to VMO and laterally to lateral border of patella. Knee was then aspirated and synovial fluid sent for  cell count. Medial parapatellar arthrotomy was injected with Marcaine and a medial parapatellar arthrotomy was then created. Synovium overlying the anterolateral distal femur was then excised as well as the infrapatellar tendon fat pad. Sleeve of soft tissue was elevated off the proximal medial tibia. Periphery of the patella was denervated using bovie cautery, Hohmann retractors then placed medially and laterally along the distal femur to protect the collateral ligaments. ACL and anterior horn of lateral meniscus were then transected. We then pinned our navigation tracker on to distal femur and then performed our anatomy survey for the femur. We placed distal femoral cutting guide such that we were perpendicular to mechanical axis, aiming for about 1 degree of flexion and about 9 mm of bony resection. Distal femur was then resected. Next, we placed AP sizing guide on distal femur and measured for a size  C femoral component. We then hero out Whitesides line and placed our AP cutting block such that we were perpendicular to Whitesides line and created 2 pin holes in 3  degrees of external rotation relative to the posterior condylar axis.being parallel to transepicondylar axis and perpidicualar to vanes line. Resected posterior femoral bone measured approx. 3 mm in difference with the lateral piece thinner than the medial piece. Next, we subluxed the tibia forward and resected medial and lateral menisci. We then pinned our navigation tracker on to the proximal tibia and then performed our anatomy survey for tibia. We placed our proximal tibial cutting guide such that we were perpendicular to mechanical axis, aiming for about 1 to 2 mm of bony resection medially and about 4 degrees posterior slope. Proximal tibial bone was then resected and detached from posterior soft tissues using Bovie cautery. Next, with knee at 90 degrees, we placed a laminar  in lateral compartment and resected the ACL and the PCL  as well as small osteophytes posteriorly along the femur. We then injected the posterior capsule with marcaine. We then assessed our gaps using a 10  mm spacer block. With that spacer block, the knee came out to full extension with nice stability with varus and valgus stress, and nice symmetry between flexion and extension. We assessed our tibial cut and our alignment demetria fell within the center of the ankle. Once we were happy with soft tissue sleeves and bony cuts, we then placed tibial protector on the proximal tibia and our chamfer cutting guide for the femur. We then created our chamfer cuts. We then placed our box cutting guide as lateral as possible while maintaining a symmetric condylar resection and reamed for our box cut. Next, we subluxed the tibia forward, and sized our proximal tibia for a size 3 baseplate, the center of which was rotated such that it was in line with medial third tibial tubercle. This was then pinned in place. We then trialed using a  10  UC trial polyethylene. With that polyethylene, the knee came out to full extension. We had nice stability with varus and valgus stress and nice symmetry between flexion and extension. Patella tracked centrally within trochlear groove. We then everted our patella and measured our patellar  thickness for 22 mm. We then resected down to approximately 13 mm of remaining bone. We then sized patella for a 31 patellar button. Three peg holes were then drilled for the patellar button and a patellar trial was then placed. We then assessed our tracking. Patella tracked centrally within trochlear groove. Once we were happy with all our trial components, we then removed all our trial components, except the tibial baseplate. We then reamed and broached for the tibial baseplate. We then drilled the sclerotic bone along the tibia using a short (4mm) drill bit as well. We then placed the knee in extension and examined the posterior aspect of knee for bleeding. Once we  achieve hemostasis, we then packed the knee and inflated the tourniquet. We then prepared our bony surfaces for cementation using pulse lavage antibiotic saline. Femoral component cement was then placed using a pressurized cement gun and then the femoral component impacted and all excess bony cement removed from the periphery and notch. Cement gun was then used to pressurize cement for the tibia the tibial component was impacted into place and all excess bony cement was removed from the periphery.We inserted the final tibial insert and then reduced the knee, everted the patella and cemented the patellar component on last. We then paused to allow for cement to harden. Once cement was hardened, we then let the tourniquet down and reexamined our gaps, stability and tracking; all of which remained unchanged. We then copiously irrigated the knee with pulse lavage betadine saline. We then closed our medial parapatellar arthrotomy with a running #2 barbed suture, subcutaneous deep fascial layer was closed with simple interrupted # 1 vicryl suture, subcutaneous skin with 2-0 Vicryl and incision with Dermabond and Steri-Strips, was then dressed with silver water proof dressing. The patient was then transferred to Recovery Room bed in stable condition.

## 2025-05-05 NOTE — NURSING
Patient arrived to room 533, oriented to the room and call light system, family at bedside, patient eating pm meal.

## 2025-05-05 NOTE — PT/OT/SLP EVAL
Physical Therapy Evaluation    Patient Name:  Luli Triplett   MRN:  [id number]    Recommendations:     Discharge Recommendations:  (TBD pending further progress with therapy)   Discharge Equipment Recommendations: to be determined by next level of care   Barriers to discharge: limited functional endurance/independence    Assessment:     Luli Triplett is a 75 y.o. female admitted with a medical diagnosis of The primary encounter diagnosis was Primary osteoarthritis of right knee. A diagnosis of Arthritis of right knee was also pertinent to this visit. She presents with the following impairments/functional limitations: weakness, impaired endurance, impaired self care skills, gait instability, impaired functional mobility, impaired sensation, impaired balance, decreased lower extremity function, pain, decreased ROM, edema, orthopedic precautions.    PT/OT co-evaluation completed due to pt's first time OOB post surgery. Pt unable to complete OOB mobility due to fatigue/drowsiness, pain and lower BP; poor sensation to RLE. Pt able to participate in bed level BLE MMT/ROM/sensation testing. PT recommending overnight stay with further progress tomorrow AM.    Rehab Prognosis: Good; patient would benefit from acute skilled PT services to address these deficits and reach maximum level of function.    Recent Surgery: Procedure(s) (LRB):  ARTHROPLASTY, KNEE, SIGHT ASSISTED (Right) * Day of Surgery *    Plan:     During this hospitalization, patient to be seen BID to address the identified rehab impairments via gait training, therapeutic activities, therapeutic exercises, neuromuscular re-education and progress toward the following goals:    Plan of Care Expires:  06/05/25    Subjective     Chief Complaint: very drowsy; difficulty staying awake throughout evaluation  Patient/Family Comments/goals: to progress mobility  Pain/Comfort:  Pain Rating 1: 4/10  Location - Side 1: Right  Location 1: knee  Pain Addressed 1:  "Reposition, Cessation of Activity, Nurse notified  Pain Rating Post-Intervention 1:  (not rated)    Patients cultural, spiritual, Jain conflicts given the current situation: no    Living Environment:  Lives with sister in 1 story home with 11 steps to enter with RHR; WIS with SC.   Prior to admission, patients level of function was MOD I with use of RW.  Equipment used at home: walker, rolling, bedside commode, shower chair.  DME owned (not currently used): none.  Upon discharge, patient will have assistance from sister.    Objective:     Communicated with Nurse prior to session.  Patient found HOB elevated with pulse ox (continuous), peripheral IV, blood pressure cuff  upon PT entry to room.    General Precautions: Standard, fall  Orthopedic Precautions:RLE weight bearing as tolerated   Braces: N/A  Respiratory Status: Room air    Exams:  Cognitive Exam:  Patient is oriented to Person, Place, Time, and Situation  Sensation:    -       Impaired  light/touch to RLE; (feels lighter in comparison to LLE)  RLE ROM: limited due to weakness/pain  RLE Strength: 0/5 hip flexion, 2-/5 knee flexion/extension, 2+/5 ankle PF/DF  LLE ROM: limited due to weakness  LLE Strength: 3-/5 hip flexion, knee extension/flexion and ankle PF/DF    Functional Mobility:  Deferred due to pt with difficulty remaining awake during bed level assessment of BLE MMT/ROM/sensation      AM-PAC 6 CLICK MOBILITY  Total Score:7       Treatment & Education:  Pt educated on role of PT and proper positioning of RLE at rest to prevent knee flexion contracture.  Pt not deemed safe for OOB mobility at this time: severe drowsiness (difficulty remaining awake) and with limited strength/ROM/sensation to light touch to RLE.   /58.  Pt reports she feels like her head "is in two places at once"   PT recommending overnight stay; nursing notified.     Patient left HOB elevated with all lines intact, call button in reach, Nurse  notified, and sister " present.    GOALS:   Multidisciplinary Problems       Physical Therapy Goals          Problem: Physical Therapy    Goal Priority Disciplines Outcome Interventions   Physical Therapy Goal     PT, PT/OT Progressing    Description: Goals to be met by: 25     Patient will increase functional independence with mobility by performin. Supine to sit with Stand-by Assistance  2. Sit to supine with Stand-by Assistance  3. Sit to stand transfer with Stand-by Assistance with use of RW.  4. Bed to chair transfer with Stand-by Assistance using Rolling Walker  5. Gait  x 50 feet with Stand-by Assistance using Rolling Walker.   6. Ascend/descend 5 stair with right Handrails Minimal Assistance using Rolling Walker.                            History:     Past Medical History:   Diagnosis Date    High cholesterol     Hypertension     Macular degeneration        Past Surgical History:   Procedure Laterality Date    HYSTERECTOMY         Time Tracking:     PT Received On: 25  PT Start Time:      PT Stop Time: 1649  PT Total Time (min): 10 min With OT    Billable Minutes: Evaluation 8      2025

## 2025-05-05 NOTE — PLAN OF CARE
1423- Time out performed for bedside nerve block procedure with RN,MD, residents. IPACK right knee. See flowsheets for VS    1424- Start    1429- End. See flowsheets for VS

## 2025-05-05 NOTE — ANESTHESIA PROCEDURE NOTES
Right Adductor Peripheral Block    Patient location during procedure: post-op   Block not for primary anesthetic.  Reason for block: at surgeon's request and post-op pain management   Post-op Pain Location: Right knee   Start time: 5/5/2025 2:18 PM  Timeout: 5/5/2025 2:14 PM   End time: 5/5/2025 2:26 PM    Staffing  Authorizing Provider: López Wilkes MD  Performing Provider: López Wilkes MD    Staffing  Performed by: López Wilkes MD  Authorized by: López Wilkes MD    Preanesthetic Checklist  Completed: patient identified, IV checked, site marked, risks and benefits discussed, surgical consent, monitors and equipment checked, pre-op evaluation and timeout performed  Peripheral Block  Patient position: supine  Prep: ChloraPrep  Patient monitoring: heart rate, cardiac monitor, continuous pulse ox, continuous capnometry and frequent blood pressure checks  Block type: adductor canal  Laterality: right  Injection technique: single shot  Needle  Needle type: Stimuplex   Needle gauge: 20 G  Needle length: 4 in  Needle localization: anatomical landmarks and ultrasound guidance   -ultrasound image captured on disc.  Assessment  Injection assessment: negative aspiration, negative parasthesia and local visualized surrounding nerve  Paresthesia pain: none  Heart rate change: no  Slow fractionated injection: yes  Pain Tolerance: comfortable throughout block  Medications:    Medications: bupivacaine (pf) (MARCAINE) injection 0.25% - Perineural   15 mL - 5/5/2025 2:22:00 PM    Additional Notes  VSS.  PACU RN monitoring vitals throughout procedure.  Patient tolerated procedure well.    With liposomal bupivicaine 1.33% 10 mL

## 2025-05-05 NOTE — PLAN OF CARE
1415- Time out performed for bedside nerve block procedure with RN,MD. Adductor canal right knee. See flowsheets for VS    1418- Start    1422- End. See flowsheets for VS

## 2025-05-06 LAB
ABSOLUTE EOSINOPHIL (OHS): 0.01 K/UL
ABSOLUTE MONOCYTE (OHS): 1.03 K/UL (ref 0.3–1)
ABSOLUTE NEUTROPHIL COUNT (OHS): 6.92 K/UL (ref 1.8–7.7)
ANION GAP (OHS): 9 MMOL/L (ref 8–16)
BASOPHILS # BLD AUTO: 0.02 K/UL
BASOPHILS NFR BLD AUTO: 0.2 %
BUN SERPL-MCNC: 19 MG/DL (ref 8–23)
CALCIUM SERPL-MCNC: 9.2 MG/DL (ref 8.7–10.5)
CHLORIDE SERPL-SCNC: 109 MMOL/L (ref 95–110)
CO2 SERPL-SCNC: 20 MMOL/L (ref 23–29)
CREAT SERPL-MCNC: 0.9 MG/DL (ref 0.5–1.4)
ERYTHROCYTE [DISTWIDTH] IN BLOOD BY AUTOMATED COUNT: 12.4 % (ref 11.5–14.5)
GFR SERPLBLD CREATININE-BSD FMLA CKD-EPI: >60 ML/MIN/1.73/M2
GLUCOSE SERPL-MCNC: 122 MG/DL (ref 70–110)
HCT VFR BLD AUTO: 29.5 % (ref 37–48.5)
HGB BLD-MCNC: 9.2 GM/DL (ref 12–16)
IMM GRANULOCYTES # BLD AUTO: 0.03 K/UL (ref 0–0.04)
IMM GRANULOCYTES NFR BLD AUTO: 0.3 % (ref 0–0.5)
LYMPHOCYTES # BLD AUTO: 0.97 K/UL (ref 1–4.8)
MAGNESIUM SERPL-MCNC: 1.5 MG/DL (ref 1.6–2.6)
MCH RBC QN AUTO: 30.5 PG (ref 27–31)
MCHC RBC AUTO-ENTMCNC: 31.2 G/DL (ref 32–36)
MCV RBC AUTO: 98 FL (ref 82–98)
NUCLEATED RBC (/100WBC) (OHS): 0 /100 WBC
PHOSPHATE SERPL-MCNC: 3.6 MG/DL (ref 2.7–4.5)
PLATELET # BLD AUTO: 135 K/UL (ref 150–450)
PLATELET BLD QL SMEAR: ABNORMAL
PMV BLD AUTO: 10.1 FL (ref 9.2–12.9)
POCT GLUCOSE: 134 MG/DL (ref 70–110)
POCT GLUCOSE: 142 MG/DL (ref 70–110)
POCT GLUCOSE: 162 MG/DL (ref 70–110)
POCT GLUCOSE: 188 MG/DL (ref 70–110)
POCT GLUCOSE: 98 MG/DL (ref 70–110)
POTASSIUM SERPL-SCNC: 3.6 MMOL/L (ref 3.5–5.1)
RBC # BLD AUTO: 3.02 M/UL (ref 4–5.4)
RELATIVE EOSINOPHIL (OHS): 0.1 %
RELATIVE LYMPHOCYTE (OHS): 10.8 % (ref 18–48)
RELATIVE MONOCYTE (OHS): 11.5 % (ref 4–15)
RELATIVE NEUTROPHIL (OHS): 77.1 % (ref 38–73)
SODIUM SERPL-SCNC: 138 MMOL/L (ref 136–145)
WBC # BLD AUTO: 8.98 K/UL (ref 3.9–12.7)

## 2025-05-06 PROCEDURE — 97530 THERAPEUTIC ACTIVITIES: CPT

## 2025-05-06 PROCEDURE — 25000003 PHARM REV CODE 250

## 2025-05-06 PROCEDURE — 84100 ASSAY OF PHOSPHORUS: CPT

## 2025-05-06 PROCEDURE — 11000001 HC ACUTE MED/SURG PRIVATE ROOM

## 2025-05-06 PROCEDURE — 63600175 PHARM REV CODE 636 W HCPCS

## 2025-05-06 PROCEDURE — 97116 GAIT TRAINING THERAPY: CPT

## 2025-05-06 PROCEDURE — 36415 COLL VENOUS BLD VENIPUNCTURE: CPT

## 2025-05-06 PROCEDURE — 85025 COMPLETE CBC W/AUTO DIFF WBC: CPT

## 2025-05-06 PROCEDURE — 80048 BASIC METABOLIC PNL TOTAL CA: CPT

## 2025-05-06 PROCEDURE — 83735 ASSAY OF MAGNESIUM: CPT

## 2025-05-06 RX ORDER — LANOLIN ALCOHOL/MO/W.PET/CERES
400 CREAM (GRAM) TOPICAL ONCE
Status: COMPLETED | OUTPATIENT
Start: 2025-05-06 | End: 2025-05-06

## 2025-05-06 RX ADMIN — TIMOLOL MALEATE 1 DROP: 5 SOLUTION OPHTHALMIC at 09:05

## 2025-05-06 RX ADMIN — BIMATOPROST 1 DROP: 0.1 SOLUTION/ DROPS OPHTHALMIC at 09:05

## 2025-05-06 RX ADMIN — SENNOSIDES AND DOCUSATE SODIUM 1 TABLET: 50; 8.6 TABLET ORAL at 08:05

## 2025-05-06 RX ADMIN — MUPIROCIN 1 G: 20 OINTMENT TOPICAL at 08:05

## 2025-05-06 RX ADMIN — BRIMONIDINE TARTRATE 1 DROP: 2 SOLUTION OPHTHALMIC at 09:05

## 2025-05-06 RX ADMIN — CELECOXIB 200 MG: 100 CAPSULE ORAL at 08:05

## 2025-05-06 RX ADMIN — CEFAZOLIN 2 G: 2 INJECTION, POWDER, FOR SOLUTION INTRAMUSCULAR; INTRAVENOUS at 03:05

## 2025-05-06 RX ADMIN — ACETAMINOPHEN 650 MG: 325 TABLET ORAL at 06:05

## 2025-05-06 RX ADMIN — PREGABALIN 75 MG: 75 CAPSULE ORAL at 08:05

## 2025-05-06 RX ADMIN — MAGNESIUM OXIDE TAB 400 MG (241.3 MG ELEMENTAL MG) 400 MG: 400 (241.3 MG) TAB at 08:05

## 2025-05-06 RX ADMIN — SODIUM CHLORIDE: 9 INJECTION, SOLUTION INTRAVENOUS at 04:05

## 2025-05-06 RX ADMIN — FAMOTIDINE 20 MG: 20 TABLET, FILM COATED ORAL at 08:05

## 2025-05-06 RX ADMIN — ACETAMINOPHEN 650 MG: 325 TABLET ORAL at 12:05

## 2025-05-06 NOTE — NURSING
Patient working with therapy, requests meds held MD notified, patient had a low bp event while working with therapy 70/40 with incontinence and loss of awareness,  Dr. Raman and Dr. Herring, notified instructions received.

## 2025-05-06 NOTE — PT/OT/SLP PROGRESS
Physical Therapy      Patient Name:  Luli Triplett   MRN:  [id number]    Patient not seen today secondary to Testing/imaging (xray/CT/MRI). LIAM RLE ultrasound/awaiting results. Will follow-up as able.    5/6/25- 15:26

## 2025-05-06 NOTE — PLAN OF CARE
Problem: Physical Therapy  Goal: Physical Therapy Goal  Description: Goals to be met by: 25     Patient will increase functional independence with mobility by performin. Supine to sit with Stand-by Assistance  2. Sit to supine with Stand-by Assistance  3. Sit to stand transfer with Stand-by Assistance with use of RW.  4. Bed to chair transfer with Stand-by Assistance using Rolling Walker  5. Gait  x 50 feet with Stand-by Assistance using Rolling Walker.   6. Ascend/descend 5 stair with right Handrails Minimal Assistance using Rolling Walker.     Outcome: Progressing     PT/OT co-session to safely progress pt's functional mobility. Pt participates in bed mobility, transfers to standing and ambulation with use of RW. In route of ambulation pt reports dizziness: transferred to sitting and /61, after continued sitting BP continues to decline 76/44 and increasing dizziness; pt returned back to bed with Total Ax4 from chair to bed; BP returns to 112/67. MD/nursing notified. Therapy will continue to progress as able.

## 2025-05-06 NOTE — PLAN OF CARE
Assumed care of pt from KAREN Rothman. Pt AAOx4, sitting up in bed with IVF and ABX infusing. Pt rated pain 8/10 at that time. Plan of care and safety protocols reviewed. Medications administered per MAR. Sister at bedside throughout shift. Safety maintained.     Problem: Adult Inpatient Plan of Care  Goal: Plan of Care Review  Outcome: Progressing  Goal: Absence of Hospital-Acquired Illness or Injury  Outcome: Progressing  Goal: Optimal Comfort and Wellbeing  Outcome: Progressing     Problem: Wound  Goal: Optimal Coping  Outcome: Progressing  Goal: Optimal Functional Ability  Outcome: Progressing  Goal: Improved Oral Intake  Outcome: Progressing  Goal: Optimal Pain Control and Function  Outcome: Progressing

## 2025-05-06 NOTE — PT/OT/SLP PROGRESS
Occupational Therapy      Patient Name:  Luli Triplett   MRN:  [id number]    Patient not seen today secondary to Testing/imaging (xray/CT/MRI). LIAM RLE ultrasound/awaiting results. Will follow-up as able.      5/6/2025

## 2025-05-06 NOTE — PT/OT/SLP PROGRESS
Occupational Therapy   Treatment    Name: Luli Triplett  MRN: [id number]  Admitting Diagnosis:  <principal problem not specified>  1 Day Post-Op    Recommendations:     Discharge Recommendations:  (TBD)  Discharge Equipment Recommendations:  to be determined by next level of care  Barriers to discharge:   (Pt requires increased level of assist; orthostatic hypotension)    Assessment:     Luli Triplett is a 75 y.o. female with a medical diagnosis of <principal problem not specified>.  She presents with The primary encounter diagnosis was Primary osteoarthritis of right knee. A diagnosis of Arthritis of right knee was also pertinent to this visit. Performance deficits affecting function are weakness, impaired functional mobility, gait instability, impaired endurance, impaired balance, decreased lower extremity function, decreased ROM, edema, orthopedic precautions, impaired cardiopulmonary response to activity, pain, impaired self care skills.     Rehab Prognosis:  Good; patient would benefit from acute skilled OT services to address these deficits and reach maximum level of function.       Plan:     Patient to be seen 5 x/week to address the above listed problems via self-care/home management, therapeutic activities, therapeutic exercises  Plan of Care Expires: 06/05/25  Plan of Care Reviewed with: patient, sibling    Subjective     Chief Complaint: dizziness during ambulation  Patient/Family Comments/goals: return home  Pain/Comfort:  Pain Rating 1: other (see comments) (mild pain; not rated)  Location - Side 1: Right  Location 1: knee  Pain Addressed 1: Reposition, Distraction, Cessation of Activity, Nurse notified    Objective:     Communicated with: nsnaz prior to session.  Patient found HOB elevated with SCD, bed alarm, PureWick upon OT entry to room.    General Precautions: Standard, fall    Orthopedic Precautions:RLE weight bearing as tolerated  Braces: N/A  Respiratory Status: Room air     Occupational  Performance:     Bed Mobility:    Patient completed Supine to Sit with minimum assistance  Patient completed Sit to Supine with total assistance and 4 persons 2/2 increased dizziness during ambulation & low BP; nsg in room to assist with safe t/f back to bed    Functional Mobility/Transfers:  Patient completed Sit <> Stand Transfer with minimum assistance and of 2 persons  with  rolling walker   Patient completed Bed <> Chair Transfer using Step Transfer technique with total assistance and of 4 persons with hand-held assist back to bed with assist from nsg for safe t/f  Functional Mobility: CGA w/RW prior to dizziness after ambulating in hallway    [facility] 6 Click ADL: 17    Treatment & Education:  Pt seen for AM session with PT, pt initially ambulating well in room & hallway.   Pt then reporting dizziness, BP taken seated at 106/61, pt reports dizziness increasing & BP taken again at 76/44 in seated position.   Charge nurse notified; nsg in room to assist with t/f back to bed, Total x4 for stand pivot t/f.   Pt responding to questions & following commands throughout.   BP in supine up to 112/67; MD notified.  Will progress as able.    Patient left HOB elevated with all lines intact, call button in reach, bed alarm on, nsg & MD notified, and sister present    GOALS:   Multidisciplinary Problems       Occupational Therapy Goals          Problem: Occupational Therapy    Goal Priority Disciplines Outcome Interventions   Occupational Therapy Goal     OT, PT/OT Ongoing    Description: Goals to be met by: 06/05/2025     Patient will increase functional independence with ADLs by performing:    Toileting from toilet with Stand-by Assistance for hygiene and clothing management.   Supine to sit with Stand-by Assistance.  Step transfer with Contact Guard Assistance  Toilet transfer to toilet with Contact Guard Assistance.                           Time Tracking:     OT Date of Treatment: 05/06/25  OT Start Time: 0939  OT Stop  Time: 1023  OT Total Time (min): 44 min    Billable Minutes:Therapeutic Activity 44 cotx with PT    OT/CHARLA: OT     Number of CHARLA visits since last OT visit: 0    5/6/2025

## 2025-05-06 NOTE — PT/OT/SLP PROGRESS
Physical Therapy Treatment    Patient Name:  Luli Triplett   MRN:  [id number]    Recommendations:     Discharge Recommendations:  (TBD with further progress with therapy)  Discharge Equipment Recommendations: to be determined by next level of care  Barriers to discharge: limited functional endurance/independence    Assessment:     Luli Triplett is a 75 y.o. female admitted with a medical diagnosis of The primary encounter diagnosis was Primary osteoarthritis of right knee. A diagnosis of Arthritis of right knee was also pertinent to this visit.  She presents with the following impairments/functional limitations: weakness, impaired endurance, impaired self care skills, impaired functional mobility, gait instability, impaired balance, decreased lower extremity function, pain, decreased ROM, edema, impaired cardiopulmonary response to activity, orthopedic precautions.    PT/OT co-session to safely progress pt's functional mobility. Pt participates in bed mobility, transfers to standing and ambulation with use of RW. In route of ambulation pt reports dizziness: transferred to sitting and /61, after continued sitting BP continues to decline 76/44 and increasing dizziness; pt returned back to bed with Total Ax4 from chair to bed; BP returns to 112/67. MD/nursing notified. Therapy will continue to progress as able.     Rehab Prognosis: Good; patient would benefit from acute skilled PT services to address these deficits and reach maximum level of function.    Recent Surgery: Procedure(s) (LRB):  ARTHROPLASTY, KNEE, SIGHT ASSISTED (Right) 1 Day Post-Op    Plan:     During this hospitalization, patient to be seen BID to address the identified rehab impairments via gait training, therapeutic activities, therapeutic exercises, neuromuscular re-education and progress toward the following goals:    Plan of Care Expires:  06/05/25    Subjective     Chief Complaint: dizziness with increased ambulation  Patient/Family  Comments/goals: to return home  Pain/Comfort:  Pain Rating 1:  (reports mild)  Location - Side 1: Right  Location 1: knee  Pain Addressed 1: Reposition, Cessation of Activity, Nurse notified  Pain Rating Post-Intervention 1:  (not rated)      Objective:     Communicated with Nurse prior to session.  Patient found HOB elevated with SCD, bed alarm upon PT entry to room.     General Precautions: Standard, fall  Orthopedic Precautions: RLE weight bearing as tolerated  Braces: N/A  Respiratory Status: Room air     Functional Mobility:  Bed Mobility:     Supine to Sit: minimum assistance  Sit to Supine: total assistance and of 4 persons; increased assistance due to pt becoming orthostatic/dizzy during ambulation; to assist with safe return to bed to improve BP  Transfers:     Sit to Stand:  minimum assistance and of 2 persons with rolling walker  Bed to Chair: total assistance and of 4 persons using  Squat Pivot; increased assistance due to pt becoming orthostatic/dizzy during ambulation; to assist with safe return to bed to improve BP  Gait: ~15ft with use of RW and CGA      AM-PAC 6 CLICK MOBILITY  Turning over in bed (including adjusting bedclothes, sheets and blankets)?: 3  Sitting down on and standing up from a chair with arms (e.g., wheelchair, bedside commode, etc.): 3  Moving from lying on back to sitting on the side of the bed?: 3  Moving to and from a bed to a chair (including a wheelchair)?: 3  Need to walk in hospital room?: 3  Climbing 3-5 steps with a railing?: 1  Basic Mobility Total Score: 16       Treatment & Education:  Pt educated on proper positioning of RLE in bed at rest to prevent knee flexion contracture.   Verbal cues for proper hand placement with use of RW.   *Pt reports having ramp to enter home in addition to steps.   Pt unable to further progress gait distance; ambulation terminated as pt becomes dizzy after ~15ft. Nursing made present to provide additional assist to get pt safely back to bed  to improve BP.   Pt urinated on self during incident of becoming orthostatic; assisted with cleaning/placement of new gown in bed; CGA to roll.       Patient left HOB elevated with all lines intact, call button in reach, bed alarm on, Nurse/MD notified, and sister present.    GOALS:   Multidisciplinary Problems       Physical Therapy Goals          Problem: Physical Therapy    Goal Priority Disciplines Outcome Interventions   Physical Therapy Goal     PT, PT/OT Progressing    Description: Goals to be met by: 25     Patient will increase functional independence with mobility by performin. Supine to sit with Stand-by Assistance  2. Sit to supine with Stand-by Assistance  3. Sit to stand transfer with Stand-by Assistance with use of RW.  4. Bed to chair transfer with Stand-by Assistance using Rolling Walker  5. Gait  x 50 feet with Stand-by Assistance using Rolling Walker.   6. Ascend/descend 5 stair with right Handrails Minimal Assistance using Rolling Walker.                              Time Tracking:     PT Received On: 25  PT Start Time: 0939     PT Stop Time: 1024  PT Total Time (min): 45 min With OT    Billable Minutes: Gait Training 20    Treatment Type: Treatment  PT/PTA: PT     Number of PTA visits since last PT visit: 0     2025

## 2025-05-06 NOTE — PLAN OF CARE
Problem: Adult Inpatient Plan of Care  Goal: Plan of Care Review  Outcome: Progressing      VIRTUAL NURSE: Pt arrived to unit. Permission received per patient to turn camera to view patient. VIP model explained; patient informed this VN will be working with bedside nurse and the rest of the care team. Plan of care reviewed with patient.  Educated patient on VTE, fall risk and fall risk precautions in place. Call light within reach, side rails up x2. Admission questions completed. Patient instucted to ask staff for assistance. Patient verbalized complete understanding. Patient denies complaints or any needs at this time. Initiated care plan.

## 2025-05-06 NOTE — NURSING
Received report from justin Peña the patient sitting up in bed awake and alert, call light in reach at present, reports no pain this am, voiding clear yellow urine with purewick, anticipating therapy visit this am, bed locked in low with alarm on and call light in reach, instructed to call for assistance.

## 2025-05-06 NOTE — PROGRESS NOTES
"[facility] Progress Note     In brief, 75 y.o. female s/p R TKA with Dr. Newman on 5/5/25.      Orthopaedic Surgeries:  1.  R TKA 5/5/25       S: No acute events overnight. Vitals stable. Pain controlled. Tolerating diet. Voiding. Has not gotten up with PT yet.      O:    Temp:  [97.3 °F (36.3 °C)-97.9 °F (36.6 °C)] 97.6 °F (36.4 °C)  Pulse:  [64-87] 70  Resp:  [10-20] 20  SpO2:  [95 %-100 %] 97 %  BP: ()/(55-83) 125/55      MSK:     Right knee  Dressing: C/D/I  Compartments: soft and compressible   Motor Intact: EHL/FHL/GS/TA  SILT  Able to start knee ROM  Pulses:2+     Labs:  Recent Results (from the past 24 hours)   POCT glucose    Collection Time: 05/05/25 10:49 AM   Result Value Ref Range    POCT Glucose 98 70 - 110 mg/dL   POCT glucose    Collection Time: 05/05/25  8:50 PM   Result Value Ref Range    POCT Glucose 179 (H) 70 - 110 mg/dL   POCT glucose    Collection Time: 05/06/25  6:13 AM   Result Value Ref Range    POCT Glucose 162 (H) 70 - 110 mg/dL       No results for input(s): "WBC", "HGB", "HCT", "PLT" in the last 72 hours.  No results for input(s): "NA", "K", "CL", "CO2", "HCO3C", "BUN", "LABCREA", "GLU" in the last 72 hours.  No results for input(s): "ESR", "CRP" in the last 72 hours.     Imaging:  Right knee X-ray demonstrates R knee TLA without evidence of complication     Assessment/Plan:     75 y.o. female s/p R TKA on 5/5/25    WBAT RLE  PT/OT  Regular diet  Family medicine following  [facility]  Possible discharge home today pending PT      Jony Raman MD  [facility]  5/6/2025 8:03 AM   "

## 2025-05-06 NOTE — CARE UPDATE
Inpatient Upgrade Note    Luli Trilpett has warranted treatment spanning two or more midnights of hospital level care for the management of Orthostasis and Right Knee arthroplasty . She continues to require monitoring of vital signs and further evaluation by consultants. Her condition is also complicated by the following comorbidities: Orthostasis.      Svitlana Herring MD  [facility], PGY-3  05/06/2025

## 2025-05-06 NOTE — PROGRESS NOTES
Future Appointments   Date Time Provider Department Center   5/7/2025  9:00 AM Marlin Ramirez, PT [facility] OP RHB2 [place] 2 fl   5/20/2025  8:15 AM [facility] ORTHO CLINIC LIMIT 350LBS [facility] ORTHXR [facility]   5/20/2025  9:00 AM Mihaela Dasilva PA-C [facility] [id number] [facility]

## 2025-05-06 NOTE — PLAN OF CARE
Pt seen for AM session with PT, pt initially ambulating well in room & hallway. Pt then reporting dizziness, BP taken seated at 106/61, pt reports dizziness increasing & BP taken again at 76/44 in seated position. Charge nurse notified; nsg in room to assist with t/f back to bed, Total x4 for stand pivot t/f. Pt responding to questions & following commands throughout. BP in supine up to 112/67; MD notified.    Problem: Occupational Therapy  Goal: Occupational Therapy Goal  Description: Goals to be met by: 06/05/2025     Patient will increase functional independence with ADLs by performing:    Toileting from toilet with Stand-by Assistance for hygiene and clothing management.   Supine to sit with Stand-by Assistance.  Step transfer with Contact Guard Assistance  Toilet transfer to toilet with Contact Guard Assistance.    Outcome: Ongoing

## 2025-05-06 NOTE — PROGRESS NOTES
Progress note  [facility]    Reason for Consult: Medical management    History of Present Illness:  Patient is a 75 y.o. female with PMHx  of HTN, HLD  and Macular Degeneration who presents after R  knee arthroplasty done on 5/5/25. Unable to tolerate PT due to drowsiness. Patient without acute complaints at time of evaluation. [facility] family medicine consulted for medical management.     Interval History: NAEON. VSS. Afebrile. Tolerating diet. Passing Gas. Has yet to work with PT.     PTA Medications   Medication Sig    atorvastatin (LIPITOR) 40 MG tablet Take 40 mg by mouth once daily.    cetirizine (ZYRTEC) 10 MG tablet Take 10 mg by mouth once daily.    hydroCHLOROthiazide 12.5 MG Tab Take 12.5 mg by mouth once daily.    lisinopriL 10 MG tablet Take 10 mg by mouth once daily.    metformin (GLUCOPHAGE) 500 MG tablet Take 500 mg by mouth daily with breakfast.    triamterene-hydrochlorothiazide 37.5-25 mg (DYAZIDE) 37.5-25 mg per capsule Take 1 capsule by mouth every morning.    calcium carbonate (OS-PARAS) 600 mg calcium (1,500 mg) Tab Take 600 mg by mouth 2 (two) times daily with meals.    meclizine (ANTIVERT) 25 mg tablet Take 1 tablet (25 mg total) by mouth 3 (three) times daily as needed.    MULTIVIT-MIN/FA/CALCIUM/VIT K1 (ONE-A-DAY WOMEN'S 50+ ORAL) Take by mouth.       Review of patient's allergies indicates:   Allergen Reactions    Asa [aspirin]     Pcn [penicillins]     Codeine Nausea Only     Reaction to codeine in codeine in cough medicine. Nausea only.  Patient can tolerate Percocet.       Past Medical History:   Diagnosis Date    High cholesterol     Hypertension     Macular degeneration      Past Surgical History:   Procedure Laterality Date    ARTHROPLASTY, KNEE, TOTAL, SIGHT ASSISTED Right 5/5/2025    Procedure: ARTHROPLASTY, KNEE, SIGHT ASSISTED;  Surgeon: Amos Newman MD;  Location: [facility] [place];  Service: Orthopedics;  Laterality: Right;  bmi - 37.06    HYSTERECTOMY       No family history on  file.  Social History[1]     Review of Systems:   Review of Systems   Constitutional:  Negative for chills and fever.   Respiratory:  Negative for shortness of breath.    Cardiovascular:  Negative for chest pain and palpitations.      OBJECTIVE:     Vital Signs (Most Recent)  Temp: 97.6 °F (36.4 °C) (05/06/25 0753)  Pulse: 70 (05/06/25 0753)  Resp: 20 (05/06/25 0753)  BP: (!) 125/55 (05/06/25 0753)  SpO2: 97 % (05/06/25 0753)    Physical Exam:  Gen- WNWD, NAD  CV- RRR, no LE edema  Resp- breathing comfortably on RA, CTAB  Abd- soft, NTND  Skin- R knee Surgical incision dressing cdi  Psych- logical thought process, answers questions appropriately     Laboratory  Lab Results   Component Value Date    WBC 5.56 03/20/2025    HGB 12.1 03/20/2025    HCT 38.8 03/20/2025    MCV 97 03/20/2025     03/20/2025      CMP  Sodium   Date Value Ref Range Status   03/20/2025 141 136 - 145 mmol/L Final     Potassium   Date Value Ref Range Status   03/20/2025 3.9 3.5 - 5.1 mmol/L Final     Chloride   Date Value Ref Range Status   03/20/2025 106 95 - 110 mmol/L Final     CO2   Date Value Ref Range Status   03/20/2025 24 23 - 29 mmol/L Final     Glucose   Date Value Ref Range Status   03/20/2025 86 70 - 110 mg/dL Final     BUN   Date Value Ref Range Status   03/20/2025 20 8 - 23 mg/dL Final     Creatinine   Date Value Ref Range Status   03/20/2025 0.8 0.5 - 1.4 mg/dL Final     Calcium   Date Value Ref Range Status   03/20/2025 10.2 8.7 - 10.5 mg/dL Final     Total Protein   Date Value Ref Range Status   03/20/2025 8.5 (H) 6.0 - 8.4 g/dL Final     Albumin   Date Value Ref Range Status   03/20/2025 4.0 3.5 - 5.2 g/dL Final   03/20/2025 4.0 3.5 - 5.2 g/dL Final     Total Bilirubin   Date Value Ref Range Status   03/20/2025 0.8 0.1 - 1.0 mg/dL Final     Comment:     For infants and newborns, interpretation of results should be based  on gestational age, weight and in agreement with clinical  observations.    Premature Infant  "recommended reference ranges:  Up to 24 hours.............<8.0 mg/dL  Up to 48 hours............<12.0 mg/dL  3-5 days..................<15.0 mg/dL  6-29 days.................<15.0 mg/dL       Alkaline Phosphatase   Date Value Ref Range Status   03/20/2025 78 40 - 150 U/L Final     AST   Date Value Ref Range Status   03/20/2025 19 10 - 40 U/L Final     ALT   Date Value Ref Range Status   03/20/2025 13 10 - 44 U/L Final     Anion Gap   Date Value Ref Range Status   03/20/2025 11 8 - 16 mmol/L Final        No results found for: "INR", "PROTIME"   No results for input(s): "CPK", "CPKMB", "TROPONINI", "MB" in the last 168 hours.   No results for input(s): "TROPONINI", "CKTOTAL", "CKMB" in the last 168 hours.    BNP  No results for input(s): "BNP" in the last 168 hours.    Urinalysis  No results for input(s): "COLORU", "CLARITYU", "SPECGRAV", "PHUR", "PROTEINUA", "GLUCOSEU", "BILIRUBINCON", "BLOODU", "WBCU", "RBCU", "BACTERIA", "MUCUS", "NITRITE", "LEUKOCYTESUR", "UROBILINOGEN", "HYALINECASTS" in the last 24 hours.   LAST HbA1c  Lab Results   Component Value Date    HGBA1C 6.1 (H) 02/06/2025             ASSESSMENT/PLAN:   75 y.o.female has a past medical history of High cholesterol, Hypertension, and Macular degeneration. here for R knee arthroplasty. Drowsiness likely secondary to anesthesia from procedure. Will reassess in the morning.     HTN  Continue home lisinopril 10mg and HCTZ 12.5mg daily in AM if not hypotensive.    Macular degeneration  Continue home Combigan and Lumigan eye drops.     HLD  Continue home atorvastatin 40mg daily    T2DM  Hold home metformin  - Accuchecks with [facility]    Family medicine will continue to follow. Please contact us if you have any further questions. Thank you for the consult.     Case to be discussed with staff. Attestation to follow.      Svitlana Herring MD  [facility], PGY-3  05/06/2025         [1]   Social History  Tobacco Use    Smoking status: Never   Substance " Use Topics    Alcohol use: No    Drug use: No

## 2025-05-06 NOTE — PLAN OF CARE
CM met with pt - she is AAO x 3 - confirmed demographics   Lives wtih sister Jose Bacon    --- Niece  Myah Sharma  [phone] lives in [place] and can assist as needed.    Niece will be able to transport pt to home.      5/05 - s/p R TKA    Pt has a rw, bsc and ttb ---   Pt has a ramp to enter house (11 steps)      Pt has been set up for OP therapy post discharge.      DC held due to continued dizziness when working with therapy.           05/06/25 1347   Discharge Planning   Assessment Type Discharge Planning Brief Assessment   Resource/Environmental Concerns none   Support Systems Family members  (sister Jose Bacon ; niece Myah Sharma  [phone])   Equipment Currently Used at Home walker, rolling;bath bench;bedside commode   Current Living Arrangements home   Patient/Family Anticipates Transition to home;home with family   Patient/Family Anticipated Services at Transition none   DME Needed Upon Discharge  none   Discharge Plan A Home;Home with family   Housing Stability   In the last 12 months, was there a time when you were not able to pay the mortgage or rent on time? N   Transportation Needs   In the past 12 months, has lack of transportation kept you from medical appointments or from getting medications? no   In the past 12 months, has lack of transportation kept you from meetings, work, or from getting things needed for daily living? No

## 2025-05-06 NOTE — NURSING
Alerted to increased swelling of surgical RLE after therapy this am, Dr. Evans aware, reported to Dr. Raman. Pulses papable, patient able to move toes of the RLE with out pain.

## 2025-05-07 LAB
ABSOLUTE EOSINOPHIL (OHS): 0.18 K/UL
ABSOLUTE MONOCYTE (OHS): 1.33 K/UL (ref 0.3–1)
ABSOLUTE NEUTROPHIL COUNT (OHS): 6.71 K/UL (ref 1.8–7.7)
BASOPHILS # BLD AUTO: 0.05 K/UL
BASOPHILS NFR BLD AUTO: 0.5 %
ERYTHROCYTE [DISTWIDTH] IN BLOOD BY AUTOMATED COUNT: 12.7 % (ref 11.5–14.5)
HCT VFR BLD AUTO: 29.1 % (ref 37–48.5)
HGB BLD-MCNC: 9.3 GM/DL (ref 12–16)
IMM GRANULOCYTES # BLD AUTO: 0.11 K/UL (ref 0–0.04)
IMM GRANULOCYTES NFR BLD AUTO: 1 % (ref 0–0.5)
LYMPHOCYTES # BLD AUTO: 2.2 K/UL (ref 1–4.8)
MCH RBC QN AUTO: 30.3 PG (ref 27–31)
MCHC RBC AUTO-ENTMCNC: 32 G/DL (ref 32–36)
MCV RBC AUTO: 95 FL (ref 82–98)
NUCLEATED RBC (/100WBC) (OHS): 0 /100 WBC
PLATELET # BLD AUTO: 220 K/UL (ref 150–450)
PLATELET BLD QL SMEAR: NORMAL
PMV BLD AUTO: 9.8 FL (ref 9.2–12.9)
POCT GLUCOSE: 110 MG/DL (ref 70–110)
POCT GLUCOSE: 118 MG/DL (ref 70–110)
POCT GLUCOSE: 130 MG/DL (ref 70–110)
POCT GLUCOSE: 145 MG/DL (ref 70–110)
RBC # BLD AUTO: 3.07 M/UL (ref 4–5.4)
RELATIVE EOSINOPHIL (OHS): 1.7 %
RELATIVE LYMPHOCYTE (OHS): 20.8 % (ref 18–48)
RELATIVE MONOCYTE (OHS): 12.6 % (ref 4–15)
RELATIVE NEUTROPHIL (OHS): 63.4 % (ref 38–73)
WBC # BLD AUTO: 10.58 K/UL (ref 3.9–12.7)

## 2025-05-07 PROCEDURE — 11000001 HC ACUTE MED/SURG PRIVATE ROOM

## 2025-05-07 PROCEDURE — 97530 THERAPEUTIC ACTIVITIES: CPT

## 2025-05-07 PROCEDURE — 63600175 PHARM REV CODE 636 W HCPCS

## 2025-05-07 PROCEDURE — 25000003 PHARM REV CODE 250

## 2025-05-07 PROCEDURE — 25000003 PHARM REV CODE 250: Performed by: ORTHOPAEDIC SURGERY

## 2025-05-07 PROCEDURE — 36415 COLL VENOUS BLD VENIPUNCTURE: CPT | Performed by: STUDENT IN AN ORGANIZED HEALTH CARE EDUCATION/TRAINING PROGRAM

## 2025-05-07 PROCEDURE — 97116 GAIT TRAINING THERAPY: CPT

## 2025-05-07 PROCEDURE — 85025 COMPLETE CBC W/AUTO DIFF WBC: CPT | Performed by: STUDENT IN AN ORGANIZED HEALTH CARE EDUCATION/TRAINING PROGRAM

## 2025-05-07 PROCEDURE — 97110 THERAPEUTIC EXERCISES: CPT

## 2025-05-07 PROCEDURE — 63600175 PHARM REV CODE 636 W HCPCS: Performed by: STUDENT IN AN ORGANIZED HEALTH CARE EDUCATION/TRAINING PROGRAM

## 2025-05-07 RX ORDER — BRIMONIDINE TARTRATE AND TIMOLOL MALEATE 2; 5 MG/ML; MG/ML
1 SOLUTION OPHTHALMIC
Status: DISCONTINUED | OUTPATIENT
Start: 2025-05-07 | End: 2025-05-08 | Stop reason: HOSPADM

## 2025-05-07 RX ORDER — SODIUM CHLORIDE, SODIUM LACTATE, POTASSIUM CHLORIDE, CALCIUM CHLORIDE 600; 310; 30; 20 MG/100ML; MG/100ML; MG/100ML; MG/100ML
INJECTION, SOLUTION INTRAVENOUS CONTINUOUS
Status: ACTIVE | OUTPATIENT
Start: 2025-05-07 | End: 2025-05-07

## 2025-05-07 RX ORDER — MAGNESIUM SULFATE HEPTAHYDRATE 40 MG/ML
2 INJECTION, SOLUTION INTRAVENOUS ONCE
Status: COMPLETED | OUTPATIENT
Start: 2025-05-07 | End: 2025-05-07

## 2025-05-07 RX ADMIN — ACETAMINOPHEN 650 MG: 325 TABLET ORAL at 12:05

## 2025-05-07 RX ADMIN — SENNOSIDES AND DOCUSATE SODIUM 1 TABLET: 50; 8.6 TABLET ORAL at 08:05

## 2025-05-07 RX ADMIN — BIMATOPROST 1 DROP: 0.1 SOLUTION/ DROPS OPHTHALMIC at 08:05

## 2025-05-07 RX ADMIN — ACETAMINOPHEN 650 MG: 325 TABLET ORAL at 05:05

## 2025-05-07 RX ADMIN — BRIMONIDINE TARTRATE, TIMOLOL MALEATE 1 DROP: 2; 5 SOLUTION/ DROPS OPHTHALMIC at 08:05

## 2025-05-07 RX ADMIN — MUPIROCIN 1 G: 20 OINTMENT TOPICAL at 08:05

## 2025-05-07 RX ADMIN — FAMOTIDINE 20 MG: 20 TABLET, FILM COATED ORAL at 08:05

## 2025-05-07 RX ADMIN — SODIUM CHLORIDE, POTASSIUM CHLORIDE, SODIUM LACTATE AND CALCIUM CHLORIDE: 600; 310; 30; 20 INJECTION, SOLUTION INTRAVENOUS at 12:05

## 2025-05-07 RX ADMIN — CELECOXIB 200 MG: 100 CAPSULE ORAL at 08:05

## 2025-05-07 RX ADMIN — MAGNESIUM SULFATE HEPTAHYDRATE 2 G: 40 INJECTION, SOLUTION INTRAVENOUS at 12:05

## 2025-05-07 RX ADMIN — PREGABALIN 75 MG: 75 CAPSULE ORAL at 08:05

## 2025-05-07 RX ADMIN — ACETAMINOPHEN 650 MG: 325 TABLET ORAL at 11:05

## 2025-05-07 RX ADMIN — ATORVASTATIN CALCIUM 40 MG: 40 TABLET, FILM COATED ORAL at 08:05

## 2025-05-07 RX ADMIN — MAGNESIUM SULFATE HEPTAHYDRATE 2 G: 40 INJECTION, SOLUTION INTRAVENOUS at 08:05

## 2025-05-07 NOTE — PLAN OF CARE
Problem: Physical Therapy  Goal: Physical Therapy Goal  Description: Goals to be met by: 25     Patient will increase functional independence with mobility by performin. Supine to sit with Stand-by Assistance  2. Sit to supine with Stand-by Assistance  3. Sit to stand transfer with Stand-by Assistance with use of RW.  4. Bed to chair transfer with Stand-by Assistance using Rolling Walker  5. Gait  x 50 feet with Stand-by Assistance using Rolling Walker.   6. Ascend/descend 5 stair with right Handrails Minimal Assistance using Rolling Walker.     Outcome: Progressing    PT/OT co-session to safely progress pt's functional mobility. Pt participates in bed mobility, transfers to standing and short distance mobility with use of RW. Pt orthostatic/symptomatic during session; returned to supine in bed at end of session with nursing present. PT recommending moderate intensity therapy. Therapy will continue to progress pt as able.

## 2025-05-07 NOTE — PLAN OF CARE
Problem: Physical Therapy  Goal: Physical Therapy Goal  Description: Goals to be met by: 25     Patient will increase functional independence with mobility by performin. Supine to sit with Stand-by Assistance  2. Sit to supine with Stand-by Assistance  3. Sit to stand transfer with Stand-by Assistance with use of RW.  4. Bed to chair transfer with Stand-by Assistance using Rolling Walker  5. Gait  x 50 feet with Stand-by Assistance using Rolling Walker.   6. Ascend/descend 5 stair with right Handrails Minimal Assistance using Rolling Walker.     2025 1621 by Valerie Brooke, PT  Outcome: Progressing    PT/OT co-session to safely progress pt's functional mobility. Pt participates in bed mobility, transfers to standing and short distance ambulation with use of RW. BP remains stable with no dizziness. Therapy will continue to progress pt as able.

## 2025-05-07 NOTE — PROGRESS NOTES
[facility] Progress Note     In brief, 75 y.o. female s/p R TKA with Dr. Newman on 5/5/25.      Orthopaedic Surgeries:  1.  R TKA 5/5/25       S: No acute events overnight. Patient got orthostatic/dizzy when working with PT yesterday. Has not ambulated since. Medicine team also ordered a DVT scan of her RLE due to some swelling, which was negative for DVT. Vitals stable, occasionally some soft pressures. Pain controlled. Tolerating diet. Voiding. Hemoglobin 9.2.   O:    Temp:  [97.4 °F (36.3 °C)-99 °F (37.2 °C)] 97.4 °F (36.3 °C)  Pulse:  [66-79] 68  Resp:  [18-20] 18  SpO2:  [94 %-98 %] 95 %  BP: ()/(55-79) 103/65      MSK:     Right knee  Dressing: C/D/I  Compartments: soft and compressible   Motor Intact: EHL/FHL/GS/TA  SILT  Able to start knee ROM  Pulses:2+     Labs:  Recent Results (from the past 24 hours)   Basic metabolic panel    Collection Time: 05/06/25 11:14 AM   Result Value Ref Range    Sodium 138 136 - 145 mmol/L    Potassium 3.6 3.5 - 5.1 mmol/L    Chloride 109 95 - 110 mmol/L    CO2 20 (L) 23 - 29 mmol/L    Glucose 122 (H) 70 - 110 mg/dL    BUN 19 8 - 23 mg/dL    Creatinine 0.9 0.5 - 1.4 mg/dL    Calcium 9.2 8.7 - 10.5 mg/dL    Anion Gap 9 8 - 16 mmol/L    eGFR >60 >60 mL/min/1.73/m2   Magnesium    Collection Time: 05/06/25 11:14 AM   Result Value Ref Range    Magnesium  1.5 (L) 1.6 - 2.6 mg/dL   Phosphorus    Collection Time: 05/06/25 11:14 AM   Result Value Ref Range    Phosphorus Level 3.6 2.7 - 4.5 mg/dL   CBC with Differential    Collection Time: 05/06/25 11:14 AM   Result Value Ref Range    WBC 8.98 3.90 - 12.70 K/uL    RBC 3.02 (L) 4.00 - 5.40 M/uL    HGB 9.2 (L) 12.0 - 16.0 gm/dL    HCT 29.5 (L) 37.0 - 48.5 %    MCV 98 82 - 98 fL    MCH 30.5 27.0 - 31.0 pg    MCHC 31.2 (L) 32.0 - 36.0 g/dL    RDW 12.4 11.5 - 14.5 %    Platelet Count 135 (L) 150 - 450 K/uL    MPV 10.1 9.2 - 12.9 fL    Nucleated RBC 0 <=0 /100 WBC    Neut % 77.1 (H) 38 - 73 %    Lymph % 10.8 (L) 18 - 48 %     "Mono % 11.5 4 - 15 %    Eos % 0.1 <=8 %    Basophil % 0.2 <=1.9 %    Imm Grans % 0.3 0.0 - 0.5 %    Neut # 6.92 1.8 - 7.7 K/uL    Lymph # 0.97 (L) 1 - 4.8 K/uL    Mono # 1.03 (H) 0.3 - 1 K/uL    Eos # 0.01 <=0.5 K/uL    Baso # 0.02 <=0.2 K/uL    Imm Grans # 0.03 0.00 - 0.04 K/uL   Morphology    Collection Time: 05/06/25 11:14 AM    Specimen: Blood   Result Value Ref Range    Platelet Estimate Decreased (A)     POCT glucose    Collection Time: 05/06/25 11:33 AM   Result Value Ref Range    POCT Glucose 134 (H) 70 - 110 mg/dL   POCT glucose    Collection Time: 05/06/25  4:23 PM   Result Value Ref Range    POCT Glucose 142 (H) 70 - 110 mg/dL   POCT glucose    Collection Time: 05/06/25  9:08 PM   Result Value Ref Range    POCT Glucose 188 (H) 70 - 110 mg/dL   POCT glucose    Collection Time: 05/07/25  6:20 AM   Result Value Ref Range    POCT Glucose 130 (H) 70 - 110 mg/dL       Recent Labs     05/06/25  1114   WBC 8.98   HGB 9.2*   HCT 29.5*   *     Recent Labs     05/06/25  1114      K 3.6      CO2 20*   BUN 19   *     No results for input(s): "ESR", "CRP" in the last 72 hours.     Imaging:  Right knee X-ray demonstrates R knee TKA without evidence of complication     Assessment/Plan:     75 y.o. female s/p R TKA on 5/5/25    WBAT RLE  PT/OT  Regular diet  Family medicine following  [facility]  Possible discharge home today pending PT      Jony Raman MD  [facility]  5/7/2025 8:03 AM   "

## 2025-05-07 NOTE — PT/OT/SLP PROGRESS
Occupational Therapy   Treatment    Name: Luli Triplett  MRN: [id number]  Admitting Diagnosis:  <principal problem not specified>  2 Days Post-Op    Recommendations:     Discharge Recommendations: Moderate Intensity Therapy  Discharge Equipment Recommendations:  to be determined by next level of care  Barriers to discharge:  Other (Comment) (pt requires increased level of assist)    Assessment:     Luli Triplett is a 75 y.o. female with a medical diagnosis of <principal problem not specified>.  She presents with The primary encounter diagnosis was Primary osteoarthritis of right knee. A diagnosis of Arthritis of right knee was also pertinent to this visit.   Performance deficits affecting function are weakness, impaired functional mobility, gait instability, impaired endurance, pain, decreased coordination, decreased lower extremity function, decreased ROM, edema, orthopedic precautions, impaired skin, impaired balance, impaired self care skills, impaired cardiopulmonary response to activity.     Rehab Prognosis:  Good; patient would benefit from acute skilled OT services to address these deficits and reach maximum level of function.       Plan:     Patient to be seen 5 x/week to address the above listed problems via self-care/home management, therapeutic activities, therapeutic exercises  Plan of Care Expires: 06/05/25  Plan of Care Reviewed with: patient, sibling    Subjective     Chief Complaint: mild dizziness AM session  Patient/Family Comments/goals: return to PLOF  Pain/Comfort:  Pain Rating 1: other (see comments) (mild pain; not rated)  Location - Side 1: Right  Location 1: knee  Pain Addressed 1: Reposition, Distraction, Cessation of Activity, Nurse notified    Objective:     Communicated with: nsg prior to session.  Patient found HOB elevated with SCD, bed alarm, PureWick upon OT entry to room.    General Precautions: Standard, fall    Orthopedic Precautions:RLE weight bearing as tolerated  Braces:  N/A  Respiratory Status: Room air     Occupational Performance:     Bed Mobility:    Patient completed Supine to Sit with moderate assistance  Patient completed Sit to Supine with moderate assistance     Functional Mobility/Transfers:  Patient completed Sit <> Stand Transfer with moderate assistance and of 2 persons  with  rolling walker   Functional Mobility: ModA for steps forwards/backwards from bed x1 trial & second person for safety; pt reports mild dizziness    Activities of Daily Living:  Lower Body Dressing: total assistance to jose B socks EOB      AMPAC 6 Click ADL: 17    Treatment & Education:  Pt seen AM & PM session this date.  Pt performing functional mobility as above AM session.  Pt continues to be limited by mild dizziness & drop in BP during ambulation.    05/07/25 1051 88 126/58 Abnormal  Standing 83 EC   05/07/25 1048 81 113/55 Abnormal  Sitting 76 EC   05/07/25 1045 88 142/66 Abnormal  Standing 95 EC   05/07/25 1041 78 128/63 Sitting 88 EC   05/07/25 1036 75 111/58 Abnormal  Lying 81 EC     Pt seen in PM for second session with PT.  Pt BP improved this session.  Pt able to performing steps forwards/backwards from bed x2 trials with standing rest break between trials.  Pt continues to require increased assist for STS from EOB; pt educated on proper hand/foot placement.    05/07/25 1530 94 144/68 Abnormal  Standing 93 AM   05/07/25 1526 80 140/78 Abnormal  Standing 103 AM   05/07/25 1523 70 144/61 Abnormal  Sitting 88 AM   05/07/25 1519 77 113/56 Abnormal  Lying 80 AM       Patient left HOB elevated with all lines intact, call button in reach, bed alarm on, nsg notified, and sister present    GOALS:   Multidisciplinary Problems       Occupational Therapy Goals          Problem: Occupational Therapy    Goal Priority Disciplines Outcome Interventions   Occupational Therapy Goal     OT, PT/OT Ongoing    Description: Goals to be met by: 06/05/2025     Patient will increase functional independence with  ADLs by performing:    Toileting from toilet with Stand-by Assistance for hygiene and clothing management.   Supine to sit with Stand-by Assistance.  Step transfer with Contact Guard Assistance  Toilet transfer to toilet with Contact Guard Assistance.                           Time Tracking:     OT Date of Treatment: 05/07/25  OT Start Time: 1028 (1516)  OT Stop Time: 1058 (1539)  OT Total Time (min): 30 min (23 min)    Billable Minutes:Therapeutic Activity 53    OT/CHARLA: OT     Number of CHARLA visits since last OT visit: 0    5/7/2025

## 2025-05-07 NOTE — PLAN OF CARE
Patient is AAOx4. Patient given medications as ordered per MAR. IV fluids given as scheduled. RLE elevated on pillow with ice pack in place. Scheduled Tylenol given for pain. Sister at bedside. Safety maintained. Bed alarm set. Instructed to use call light for assistance.          Problem: Fall Injury Risk  Goal: Absence of Fall and Fall-Related Injury  Outcome: Progressing     Problem: Knee Arthroplasty  Goal: Optimal Pain Control and Function  Outcome: Progressing     Problem: Comorbidity Management  Goal: Blood Pressure in Desired Range  Outcome: Progressing

## 2025-05-07 NOTE — NURSING
RAPID RESPONSE NURSE PROACTIVE ROUNDING NOTE       Right upper arm 20g PIV placed via ultrasound. Blood return noted and flushed without difficulty.    FOLLOW UP    Call back the Rapid Response NurseImtiaz at [phone] for additional questions or concerns.

## 2025-05-07 NOTE — PT/OT/SLP PROGRESS
Physical Therapy Treatment    Patient Name:  Luli Triplett   MRN:  [id number]    Recommendations:     Discharge Recommendations: Moderate Intensity Therapy  Discharge Equipment Recommendations: to be determined by next level of care  Barriers to discharge: limited functional endurance/independence    Assessment:     Luli Triplett is a 75 y.o. female admitted with a medical diagnosis of The primary encounter diagnosis was Primary osteoarthritis of right knee. A diagnosis of Arthritis of right knee was also pertinent to this visit. She presents with the following impairments/functional limitations: weakness, impaired endurance, impaired self care skills, impaired functional mobility, gait instability, impaired balance, decreased lower extremity function, pain, decreased ROM, edema, orthopedic precautions .    PT/OT co-session to safely progress pt's functional mobility. Pt participates in bed mobility, transfers to standing and short distance ambulation with use of RW. BP remains stable with no dizziness. Therapy will continue to progress pt as able.     Rehab Prognosis: Good; patient would benefit from acute skilled PT services to address these deficits and reach maximum level of function.    Recent Surgery: Procedure(s) (LRB):  ARTHROPLASTY, KNEE, SIGHT ASSISTED (Right) 2 Days Post-Op    Plan:     During this hospitalization, patient to be seen BID to address the identified rehab impairments via gait training, therapeutic activities, therapeutic exercises, neuromuscular re-education and progress toward the following goals:    Plan of Care Expires:  06/05/25    Subjective     Chief Complaint: fatigue post mobility  Patient/Family Comments/goals: to progress mobility  Pain/Comfort:  Pain Rating 1:  (mild pain; noted rated)  Location - Side 1: Right  Location 1: knee  Pain Addressed 1: Reposition, Cessation of Activity, Nurse notified  Pain Rating Post-Intervention 1:  (not rated)      Objective:     Communicated  with Nurse prior to session.  Patient found HOB elevated with SCD, bed alarm, PureWick, peripheral IV upon PT entry to room.     General Precautions: Standard, fall  Orthopedic Precautions: RLE weight bearing as tolerated  Braces: N/A  Respiratory Status: Room air     Functional Mobility:  Bed Mobility:     Supine to Sit: moderate assistance  Sit to Supine: moderate assistance  Transfers:     Sit to Stand:  moderate assistance and of 2 persons with rolling walker  Gait: ~3ft consisting of forward and retro steps with use of RW; standing rest break then additional 3ft with use of RW; verbal cues for proper RW sequencing and BLE stepping pattern.       AM-PAC 6 CLICK MOBILITY  Turning over in bed (including adjusting bedclothes, sheets and blankets)?: 3  Sitting down on and standing up from a chair with arms (e.g., wheelchair, bedside commode, etc.): 2  Moving from lying on back to sitting on the side of the bed?: 2  Moving to and from a bed to a chair (including a wheelchair)?: 3  Need to walk in hospital room?: 3  Climbing 3-5 steps with a railing?: 1  Basic Mobility Total Score: 14       Treatment & Education:  Pt with decreased dizziness during PM session.   Pt's BP in supine with HOB elevated 113/56; in sitting /61, in standing 140/78 and in standing post mobility 144/68.   Pt with fatigue post ambulating ~6ft with standing rest break between distance; safely returned back to bed.   Pt educated on proper positioning of RLE at rest.   Pt educated on use of call button; pt understanding.     Patient left HOB elevated with all lines intact, call button in reach, bed alarm on, Nurse notified, and sister present.    GOALS:   Multidisciplinary Problems       Physical Therapy Goals          Problem: Physical Therapy    Goal Priority Disciplines Outcome Interventions   Physical Therapy Goal     PT, PT/OT Progressing    Description: Goals to be met by: 6/5/25     Patient will increase functional independence with  mobility by performin. Supine to sit with Stand-by Assistance  2. Sit to supine with Stand-by Assistance  3. Sit to stand transfer with Stand-by Assistance with use of RW.  4. Bed to chair transfer with Stand-by Assistance using Rolling Walker  5. Gait  x 50 feet with Stand-by Assistance using Rolling Walker.   6. Ascend/descend 5 stair with right Handrails Minimal Assistance using Rolling Walker.                          Time Tracking:     PT Received On: 25  PT Start Time:      PT Stop Time: 1539  PT Total Time (min): 23 min With OT    Billable Minutes: Gait Training 8 and Therapeutic Activity 15    Treatment Type: Treatment  PT/PTA: PT     Number of PTA visits since last PT visit: 0     2025

## 2025-05-07 NOTE — PT/OT/SLP PROGRESS
Physical Therapy Treatment    Patient Name:  Luli Triplett   MRN:  [id number]    Recommendations:     Discharge Recommendations: Moderate Intensity Therapy  Discharge Equipment Recommendations: to be determined by next level of care  Barriers to discharge: limited functional endurance/independence    Assessment:     Luli Triplett is a 75 y.o. female admitted with a medical diagnosis of The primary encounter diagnosis was Primary osteoarthritis of right knee. A diagnosis of Arthritis of right knee was also pertinent to this visit. She presents with the following impairments/functional limitations: weakness, impaired endurance, impaired self care skills, impaired functional mobility, gait instability, impaired balance, decreased lower extremity function, pain, decreased ROM, edema, orthopedic precautions, impaired cardiopulmonary response to activity.    PT/OT co-session to safely progress pt's functional mobility. Pt participates in bed mobility, transfers to standing and short distance mobility with use of RW. Pt orthostatic/symptomatic during session; returned to supine in bed at end of session with nursing present. PT recommending moderate intensity therapy. Therapy will continue to progress pt as able.     Rehab Prognosis: Good; patient would benefit from acute skilled PT services to address these deficits and reach maximum level of function.    Recent Surgery: Procedure(s) (LRB):  ARTHROPLASTY, KNEE, SIGHT ASSISTED (Right) 2 Days Post-Op    Plan:     During this hospitalization, patient to be seen BID to address the identified rehab impairments via gait training, therapeutic activities, therapeutic exercises, neuromuscular re-education and progress toward the following goals:    Plan of Care Expires:  06/05/25    Subjective     Chief Complaint: mild pain; dizziness post short distance ambulation  Patient/Family Comments/goals: to progress mobility  Pain/Comfort:  Pain Rating 1:  (mild pain; not  rated)  Location - Side 1: Right  Pain Addressed 1: Reposition, Cessation of Activity, Nurse notified  Pain Rating Post-Intervention 1:  (not rated)      Objective:     Communicated with Nurse prior to session.  Patient found HOB elevated with SCD, bed alarm, PureWick upon PT entry to room.     General Precautions: Standard, fall  Orthopedic Precautions: RLE weight bearing as tolerated  Braces: N/A  Respiratory Status: Room air     Functional Mobility:  Bed Mobility:     Supine to Sit: moderate assistance  Sit to Supine: moderate assistance and of 2 persons  Transfers:     Sit to Stand:  moderate assistance and of 2 persons with rolling walker  Gait: ~3ft with use of RW and MOD Ax1 and second person on SBA for safety      AM-PAC 6 CLICK MOBILITY  Turning over in bed (including adjusting bedclothes, sheets and blankets)?: 3  Sitting down on and standing up from a chair with arms (e.g., wheelchair, bedside commode, etc.): 2  Moving from lying on back to sitting on the side of the bed?: 2  Moving to and from a bed to a chair (including a wheelchair)?: 2  Need to walk in hospital room?: 2  Climbing 3-5 steps with a railing?: 1  Basic Mobility Total Score: 12       Treatment & Education:  Pt performs seated BLE ankle pumps and BLE knee extension kicks x10 each (assist with RLE per PT to increase ROM/ to decrease stiffness prior to standing).   Pt's BP in supine 111/58; sitting /63, standing 142/66, in sitting post ~3ft of ambulation 113/55 (reported dizziness); additional transfer to standing (/58)- pt still reporting increasing dizziness- pt safely returned to supine in bed. Nursing present at end of session. Dizziness improves with return to bed.     Patient left HOB elevated with all lines intact, call button in reach, bed alarm on, Nurse/MD notified, and nurse/sibling present..    GOALS:   Multidisciplinary Problems       Physical Therapy Goals          Problem: Physical Therapy    Goal Priority  Disciplines Outcome Interventions   Physical Therapy Goal     PT, PT/OT Progressing    Description: Goals to be met by: 25     Patient will increase functional independence with mobility by performin. Supine to sit with Stand-by Assistance  2. Sit to supine with Stand-by Assistance  3. Sit to stand transfer with Stand-by Assistance with use of RW.  4. Bed to chair transfer with Stand-by Assistance using Rolling Walker  5. Gait  x 50 feet with Stand-by Assistance using Rolling Walker.   6. Ascend/descend 5 stair with right Handrails Minimal Assistance using Rolling Walker.                          Time Tracking:     PT Received On: 25  PT Start Time: 1031     PT Stop Time: 1100  PT Total Time (min): 29 min With OT    Billable Minutes: Therapeutic Activity 15 and Therapeutic Exercise 8    Treatment Type: Treatment  PT/PTA: PT     Number of PTA visits since last PT visit: 0     2025

## 2025-05-07 NOTE — PLAN OF CARE
Pt AAOx4, with sister at bedside. Pain well controlled with scheduled tylenol. Moderate swelling noted to RLE. SCD sleeve removed, ice packs placed and leg elevated on 3 pillows. Neuro checks WDL.  BP low at midnight VS; pt asymptomatic. . Dr. Ramirez notified. No new orders placed. Plan of care and safety protocols reviewed. Safety maintained.     Problem: Adult Inpatient Plan of Care  Goal: Optimal Comfort and Wellbeing  Outcome: Progressing  Goal: Readiness for Transition of Care  Outcome: Progressing     Problem: Wound  Goal: Optimal Coping  Outcome: Progressing  Goal: Optimal Pain Control and Function  Outcome: Progressing  Goal: Optimal Wound Healing  Outcome: Progressing     Problem: Fall Injury Risk  Goal: Absence of Fall and Fall-Related Injury  Outcome: Progressing     Problem: Knee Arthroplasty  Goal: Optimal Coping  Outcome: Progressing  Goal: Optimal Pain Control and Function  Outcome: Progressing

## 2025-05-07 NOTE — PROGRESS NOTES
Progress note  [facility]    Reason for Consult: Medical management    History of Present Illness:  Patient is a 75 y.o. female with PMHx  of HTN, HLD  and Macular Degeneration who presents after R  knee arthroplasty done on 5/5/25. Unable to tolerate PT due to drowsiness. Patient without acute complaints at time of evaluation. [facility] consulted for medical management.     Interval History: NAEON. VSS. Afebrile. Tolerating diet. Passing Gas. Orthostatic with PT yesterday.     PTA Medications   Medication Sig    atorvastatin (LIPITOR) 40 MG tablet Take 40 mg by mouth once daily.    cetirizine (ZYRTEC) 10 MG tablet Take 10 mg by mouth once daily.    hydroCHLOROthiazide 12.5 MG Tab Take 12.5 mg by mouth once daily.    lisinopriL 10 MG tablet Take 10 mg by mouth once daily.    metformin (GLUCOPHAGE) 500 MG tablet Take 500 mg by mouth daily with breakfast.    triamterene-hydrochlorothiazide 37.5-25 mg (DYAZIDE) 37.5-25 mg per capsule Take 1 capsule by mouth every morning.    calcium carbonate (OS-PARAS) 600 mg calcium (1,500 mg) Tab Take 600 mg by mouth 2 (two) times daily with meals.    meclizine (ANTIVERT) 25 mg tablet Take 1 tablet (25 mg total) by mouth 3 (three) times daily as needed.    MULTIVIT-MIN/FA/CALCIUM/VIT K1 (ONE-A-DAY WOMEN'S 50+ ORAL) Take by mouth.       Review of patient's allergies indicates:   Allergen Reactions    Asa [aspirin]     Pcn [penicillins]     Codeine Nausea Only     Reaction to codeine in codeine in cough medicine. Nausea only.  Patient can tolerate Percocet.       Past Medical History:   Diagnosis Date    High cholesterol     Hypertension     Macular degeneration      Past Surgical History:   Procedure Laterality Date    ARTHROPLASTY, KNEE, TOTAL, SIGHT ASSISTED Right 5/5/2025    Procedure: ARTHROPLASTY, KNEE, SIGHT ASSISTED;  Surgeon: Amos Newman MD;  Location: [facility] OR;  Service: Orthopedics;  Laterality: Right;  bmi - 37.06    HYSTERECTOMY       No family history on  file.  Social History[1]     Review of Systems:   Review of Systems   Constitutional:  Negative for chills and fever.   Respiratory:  Negative for shortness of breath.    Cardiovascular:  Negative for chest pain and palpitations.      OBJECTIVE:     Vital Signs (Most Recent)  Temp: 97.9 °F (36.6 °C) (05/07/25 1141)  Pulse: 66 (05/07/25 1141)  Resp: 18 (05/07/25 1141)  BP: 111/67 (05/07/25 1141)  SpO2: 97 % (05/07/25 1141)    Physical Exam:  Gen- WNWD, NAD  CV- RRR, no LE edema  Resp- breathing comfortably on RA, CTAB  Abd- soft, NTND  Skin- R knee Surgical incision dressing cdi  Psych- logical thought process, answers questions appropriately     Laboratory  Lab Results   Component Value Date    WBC 10.58 05/07/2025    HGB 9.3 (L) 05/07/2025    HCT 29.1 (L) 05/07/2025    MCV 95 05/07/2025     05/07/2025      CMP  Sodium   Date Value Ref Range Status   05/06/2025 138 136 - 145 mmol/L Final   03/20/2025 141 136 - 145 mmol/L Final     Potassium   Date Value Ref Range Status   05/06/2025 3.6 3.5 - 5.1 mmol/L Final   03/20/2025 3.9 3.5 - 5.1 mmol/L Final     Chloride   Date Value Ref Range Status   05/06/2025 109 95 - 110 mmol/L Final   03/20/2025 106 95 - 110 mmol/L Final     CO2   Date Value Ref Range Status   05/06/2025 20 (L) 23 - 29 mmol/L Final   03/20/2025 24 23 - 29 mmol/L Final     Glucose   Date Value Ref Range Status   05/06/2025 122 (H) 70 - 110 mg/dL Final   03/20/2025 86 70 - 110 mg/dL Final     BUN   Date Value Ref Range Status   05/06/2025 19 8 - 23 mg/dL Final     Creatinine   Date Value Ref Range Status   05/06/2025 0.9 0.5 - 1.4 mg/dL Final     Calcium   Date Value Ref Range Status   05/06/2025 9.2 8.7 - 10.5 mg/dL Final   03/20/2025 10.2 8.7 - 10.5 mg/dL Final     Total Protein   Date Value Ref Range Status   03/20/2025 8.5 (H) 6.0 - 8.4 g/dL Final     Albumin   Date Value Ref Range Status   03/20/2025 4.0 3.5 - 5.2 g/dL Final   03/20/2025 4.0 3.5 - 5.2 g/dL Final     Total Bilirubin   Date  "Value Ref Range Status   03/20/2025 0.8 0.1 - 1.0 mg/dL Final     Comment:     For infants and newborns, interpretation of results should be based  on gestational age, weight and in agreement with clinical  observations.    Premature Infant recommended reference ranges:  Up to 24 hours.............<8.0 mg/dL  Up to 48 hours............<12.0 mg/dL  3-5 days..................<15.0 mg/dL  6-29 days.................<15.0 mg/dL       Alkaline Phosphatase   Date Value Ref Range Status   03/20/2025 78 40 - 150 U/L Final     AST   Date Value Ref Range Status   03/20/2025 19 10 - 40 U/L Final     ALT   Date Value Ref Range Status   03/20/2025 13 10 - 44 U/L Final     Anion Gap   Date Value Ref Range Status   05/06/2025 9 8 - 16 mmol/L Final        No results found for: "INR", "PROTIME"   No results for input(s): "CPK", "CPKMB", "TROPONINI", "MB" in the last 168 hours.   No results for input(s): "TROPONINI", "CKTOTAL", "CKMB" in the last 168 hours.    BNP  No results for input(s): "BNP" in the last 168 hours.    Urinalysis  No results for input(s): "COLORU", "CLARITYU", "SPECGRAV", "PHUR", "PROTEINUA", "GLUCOSEU", "BILIRUBINCON", "BLOODU", "WBCU", "RBCU", "BACTERIA", "MUCUS", "NITRITE", "LEUKOCYTESUR", "UROBILINOGEN", "HYALINECASTS" in the last 24 hours.   LAST HbA1c  Lab Results   Component Value Date    HGBA1C 6.1 (H) 02/06/2025             ASSESSMENT/PLAN:   75 y.o.female has a past medical history of High cholesterol, Hypertension, and Macular degeneration. here for R knee arthroplasty. Drowsiness likely secondary to anesthesia from procedure. Will reassess in the morning.       Orthostatic Hypotension  BMP unremarkable  H/H 9.2/29.5  Suspect likely due to recent surgical procedure    Plan  Hold HTN medications  Encourage PO intake. If remains orthostatic will initiate maintenance fluids    Anemia  H/H 9.2/29.5; likely 2/2 to surgery    Plan  Repeat CBC today  Monitor clinically for signs of active bleeding    Right Knee " Swelling  Likely 2/2 to surgical procedure although patient endorses history of lymphedema. US negative    Plan  Monitor clinically    Right knee arthroplasty  PT/OT for improving mobility  Management per ortho    HTN  Hold home lisinopril 10mg and HCTZ 12.5mg due to orthostasis    Macular degeneration  Continue home Combigan and Lumigan eye drops.     HLD  Continue home atorvastatin 40mg daily    T2DM  Hold home metformin  - Accuchecks with [facility]      Family medicine will continue to follow. Please contact us if you have any further questions. Thank you for the consult.     Case to be discussed with staff. Attestation to follow.      Svitlana Herring MD  [facility], PGY-3  05/07/2025         [1]   Social History  Tobacco Use    Smoking status: Never   Substance Use Topics    Alcohol use: No    Drug use: No

## 2025-05-08 VITALS
HEIGHT: 62 IN | TEMPERATURE: 99 F | WEIGHT: 203.06 LBS | SYSTOLIC BLOOD PRESSURE: 135 MMHG | RESPIRATION RATE: 17 BRPM | HEART RATE: 83 BPM | OXYGEN SATURATION: 98 % | DIASTOLIC BLOOD PRESSURE: 79 MMHG | BODY MASS INDEX: 37.37 KG/M2

## 2025-05-08 LAB
POCT GLUCOSE: 137 MG/DL (ref 70–110)
POCT GLUCOSE: 77 MG/DL (ref 70–110)
POCT GLUCOSE: 97 MG/DL (ref 70–110)

## 2025-05-08 PROCEDURE — 25000003 PHARM REV CODE 250

## 2025-05-08 PROCEDURE — 97530 THERAPEUTIC ACTIVITIES: CPT

## 2025-05-08 PROCEDURE — 97116 GAIT TRAINING THERAPY: CPT

## 2025-05-08 PROCEDURE — 63600175 PHARM REV CODE 636 W HCPCS

## 2025-05-08 PROCEDURE — 25000003 PHARM REV CODE 250: Performed by: ORTHOPAEDIC SURGERY

## 2025-05-08 RX ORDER — POLYETHYLENE GLYCOL 3350 17 G/17G
17 POWDER, FOR SOLUTION ORAL DAILY
Status: DISCONTINUED | OUTPATIENT
Start: 2025-05-08 | End: 2025-05-08 | Stop reason: HOSPADM

## 2025-05-08 RX ADMIN — CELECOXIB 200 MG: 100 CAPSULE ORAL at 08:05

## 2025-05-08 RX ADMIN — ACETAMINOPHEN 650 MG: 325 TABLET ORAL at 06:05

## 2025-05-08 RX ADMIN — ATORVASTATIN CALCIUM 40 MG: 40 TABLET, FILM COATED ORAL at 08:05

## 2025-05-08 RX ADMIN — ACETAMINOPHEN 650 MG: 325 TABLET ORAL at 11:05

## 2025-05-08 RX ADMIN — ACETAMINOPHEN 650 MG: 325 TABLET ORAL at 05:05

## 2025-05-08 RX ADMIN — PREGABALIN 75 MG: 75 CAPSULE ORAL at 08:05

## 2025-05-08 RX ADMIN — SENNOSIDES AND DOCUSATE SODIUM 1 TABLET: 50; 8.6 TABLET ORAL at 08:05

## 2025-05-08 RX ADMIN — POLYETHYLENE GLYCOL 3350 17 G: 17 POWDER, FOR SOLUTION ORAL at 09:05

## 2025-05-08 RX ADMIN — MUPIROCIN 1 G: 20 OINTMENT TOPICAL at 08:05

## 2025-05-08 RX ADMIN — SODIUM CHLORIDE, POTASSIUM CHLORIDE, SODIUM LACTATE AND CALCIUM CHLORIDE 500 ML: 600; 310; 30; 20 INJECTION, SOLUTION INTRAVENOUS at 09:05

## 2025-05-08 RX ADMIN — FAMOTIDINE 20 MG: 20 TABLET, FILM COATED ORAL at 08:05

## 2025-05-08 RX ADMIN — BRIMONIDINE TARTRATE, TIMOLOL MALEATE 1 DROP: 2; 5 SOLUTION/ DROPS OPHTHALMIC at 08:05

## 2025-05-08 NOTE — PROGRESS NOTES
Progress note  [facility]    Reason for Consult: Medical management    History of Present Illness:  Patient is a 75 y.o. female with PMHx  of HTN, HLD  and Macular Degeneration who presents after R  knee arthroplasty done on 5/5/25. Unable to tolerate PT due to drowsiness. Patient without acute complaints at time of evaluation. [facility] family medicine consulted for medical management.     Interval History: Started on maintenance fluids for orthostatic BP yesterday while working with PT/OT yesterday. Episode of asymptomatic soft BP's overnight, MAPS 66. Doing well this AM. Denies any chest pain, dizziness, SOB or palpitations this AM.     PTA Medications   Medication Sig    atorvastatin (LIPITOR) 40 MG tablet Take 40 mg by mouth once daily.    cetirizine (ZYRTEC) 10 MG tablet Take 10 mg by mouth once daily.    hydroCHLOROthiazide 12.5 MG Tab Take 12.5 mg by mouth once daily.    lisinopriL 10 MG tablet Take 10 mg by mouth once daily.    metformin (GLUCOPHAGE) 500 MG tablet Take 500 mg by mouth daily with breakfast.    triamterene-hydrochlorothiazide 37.5-25 mg (DYAZIDE) 37.5-25 mg per capsule Take 1 capsule by mouth every morning.    calcium carbonate (OS-PARAS) 600 mg calcium (1,500 mg) Tab Take 600 mg by mouth 2 (two) times daily with meals.    meclizine (ANTIVERT) 25 mg tablet Take 1 tablet (25 mg total) by mouth 3 (three) times daily as needed.    MULTIVIT-MIN/FA/CALCIUM/VIT K1 (ONE-A-DAY WOMEN'S 50+ ORAL) Take by mouth.       Review of patient's allergies indicates:   Allergen Reactions    Asa [aspirin]     Pcn [penicillins]     Codeine Nausea Only     Reaction to codeine in codeine in cough medicine. Nausea only.  Patient can tolerate Percocet.       Past Medical History:   Diagnosis Date    High cholesterol     Hypertension     Macular degeneration      Past Surgical History:   Procedure Laterality Date    ARTHROPLASTY, KNEE, TOTAL, SIGHT ASSISTED Right 5/5/2025    Procedure: ARTHROPLASTY, KNEE, SIGHT  ASSISTED;  Surgeon: Amos Newman MD;  Location: [facility];  Service: Orthopedics;  Laterality: Right;  bmi - 37.06    HYSTERECTOMY       No family history on file.  Social History[1]     Review of Systems:   Review of Systems   Constitutional:  Negative for chills and fever.   Respiratory:  Negative for shortness of breath.    Cardiovascular:  Negative for chest pain and palpitations.      OBJECTIVE:     Vital Signs (Most Recent)  Temp: 99.2 °F (37.3 °C) (05/08/25 1202)  Pulse: 91 (05/08/25 1202)  Resp: 18 (05/08/25 1202)  BP: 131/73 (05/08/25 1202)  SpO2: 99 % (05/08/25 1202)    Physical Exam:  Gen- WNWD, NAD  CV- RRR, no LE edema  Resp- breathing comfortably on RA, CTAB  Abd- soft, NTND  Skin- R knee Surgical incision dressing cdi  Psych- logical thought process, answers questions appropriately     Laboratory  Lab Results   Component Value Date    WBC 10.58 05/07/2025    HGB 9.3 (L) 05/07/2025    HCT 29.1 (L) 05/07/2025    MCV 95 05/07/2025     05/07/2025      CMP  Sodium   Date Value Ref Range Status   05/06/2025 138 136 - 145 mmol/L Final   03/20/2025 141 136 - 145 mmol/L Final     Potassium   Date Value Ref Range Status   05/06/2025 3.6 3.5 - 5.1 mmol/L Final   03/20/2025 3.9 3.5 - 5.1 mmol/L Final     Chloride   Date Value Ref Range Status   05/06/2025 109 95 - 110 mmol/L Final   03/20/2025 106 95 - 110 mmol/L Final     CO2   Date Value Ref Range Status   05/06/2025 20 (L) 23 - 29 mmol/L Final   03/20/2025 24 23 - 29 mmol/L Final     Glucose   Date Value Ref Range Status   05/06/2025 122 (H) 70 - 110 mg/dL Final   03/20/2025 86 70 - 110 mg/dL Final     BUN   Date Value Ref Range Status   05/06/2025 19 8 - 23 mg/dL Final     Creatinine   Date Value Ref Range Status   05/06/2025 0.9 0.5 - 1.4 mg/dL Final     Calcium   Date Value Ref Range Status   05/06/2025 9.2 8.7 - 10.5 mg/dL Final   03/20/2025 10.2 8.7 - 10.5 mg/dL Final     Total Protein   Date Value Ref Range Status   03/20/2025 8.5 (H) 6.0 - 8.4 g/dL  "Final     Albumin   Date Value Ref Range Status   03/20/2025 4.0 3.5 - 5.2 g/dL Final   03/20/2025 4.0 3.5 - 5.2 g/dL Final     Total Bilirubin   Date Value Ref Range Status   03/20/2025 0.8 0.1 - 1.0 mg/dL Final     Comment:     For infants and newborns, interpretation of results should be based  on gestational age, weight and in agreement with clinical  observations.    Premature Infant recommended reference ranges:  Up to 24 hours.............<8.0 mg/dL  Up to 48 hours............<12.0 mg/dL  3-5 days..................<15.0 mg/dL  6-29 days.................<15.0 mg/dL       Alkaline Phosphatase   Date Value Ref Range Status   03/20/2025 78 40 - 150 U/L Final     AST   Date Value Ref Range Status   03/20/2025 19 10 - 40 U/L Final     ALT   Date Value Ref Range Status   03/20/2025 13 10 - 44 U/L Final     Anion Gap   Date Value Ref Range Status   05/06/2025 9 8 - 16 mmol/L Final        No results found for: "INR", "PROTIME"   No results for input(s): "CPK", "CPKMB", "TROPONINI", "MB" in the last 168 hours.   No results for input(s): "TROPONINI", "CKTOTAL", "CKMB" in the last 168 hours.    BNP  No results for input(s): "BNP" in the last 168 hours.    Urinalysis  No results for input(s): "COLORU", "CLARITYU", "SPECGRAV", "PHUR", "PROTEINUA", "GLUCOSEU", "BILIRUBINCON", "BLOODU", "WBCU", "RBCU", "BACTERIA", "MUCUS", "NITRITE", "LEUKOCYTESUR", "UROBILINOGEN", "HYALINECASTS" in the last 24 hours.   LAST HbA1c  Lab Results   Component Value Date    HGBA1C 6.1 (H) 02/06/2025             ASSESSMENT/PLAN:   75 y.o.female has a past medical history of High cholesterol, Hypertension, and Macular degeneration. here for R knee arthroplasty. Drowsiness likely secondary to anesthesia from procedure. Will reassess in the morning.       Orthostatic Hypotension  BMP unremarkable  H/H 9.2/29.5  Suspect likely due to recent surgical procedure    Plan  Hold HTN medications  S/p maintenance fluids overnight. Will add 500 cc bolus in " order to avoid any hypotensive episodes. Will hold gabapentin for now.     Anemia  H/H 9.2/29.5; likely 2/2 to surgery  Repeat CBC with stable H/H     Plan  Repeat CBC today  Monitor clinically for signs of active bleeding    Right Knee Swelling  Likely 2/2 to surgical procedure although patient endorses history of lymphedema. US negative    Plan  Monitor clinically    Right knee arthroplasty  PT/OT for improving mobility  Management per ortho    HTN  Hold home lisinopril 10mg and HCTZ 12.5mg due to orthostasis    Macular degeneration  Continue home Combigan and Lumigan eye drops.     HLD  Continue home atorvastatin 40mg daily    T2DM  Hold home metformin  - Accuchecks with [facility]      Family medicine will continue to follow. Please contact us if you have any further questions. Thank you for the consult.     Case to be discussed with staff. Attestation to follow.      Svitlana Herring MD  \[facility]\"" Family Medicine, PGY-3  05/08/2025         [1]   Social History  Tobacco Use    Smoking status: Never   Substance Use Topics    Alcohol use: No    Drug use: No

## 2025-05-08 NOTE — PT/OT/SLP PROGRESS
Physical Therapy Treatment    Patient Name:  Luli Triplett   MRN:  [id number]    Recommendations:     Discharge Recommendations: Moderate Intensity Therapy  Discharge Equipment Recommendations: to be determined by next level of care  Barriers to discharge: limited functional endurance/independence    Assessment:     Luli Triplett is a 75 y.o. female admitted with a medical diagnosis of The primary encounter diagnosis was Primary osteoarthritis of right knee. A diagnosis of Arthritis of right knee was also pertinent to this visit.  She presents with the following impairments/functional limitations: weakness, impaired endurance, impaired self care skills, impaired functional mobility, gait instability, impaired balance, decreased lower extremity function, pain, decreased ROM, edema, orthopedic precautions.    PT/OT co-session to safely progress pt's functional mobility. Pt with no reported dizziness during mobility; BP monitored. Pt participates in bed mobility, seated BLE there-ex, transfers to standing and ambulation with use of RW.     Rehab Prognosis: Good; patient would benefit from acute skilled PT services to address these deficits and reach maximum level of function.    Recent Surgery: Procedure(s) (LRB):  ARTHROPLASTY, KNEE, SIGHT ASSISTED (Right) 3 Days Post-Op    Plan:     During this hospitalization, patient to be seen BID to address the identified rehab impairments via therapeutic activities, gait training, therapeutic exercises, neuromuscular re-education and progress toward the following goals:    Plan of Care Expires:  06/05/25    Subjective     Chief Complaint: mild pain to R knee with mobility  Patient/Family Comments/goals: to progress mobility independence  Pain/Comfort:  Pain Rating 1:  (mild pain with mobility)  Location - Side 1: Right  Location 1: knee  Pain Addressed 1: Reposition, Cessation of Activity, Nurse notified  Pain Rating Post-Intervention 1:  (not rated)      Objective:      Communicated with Nurse prior to session.  Patient found HOB elevated with SCD, bed alarm, PureWick, peripheral IV upon PT entry to room.     General Precautions: Standard, fall  Orthopedic Precautions: RLE weight bearing as tolerated  Braces: N/A  Respiratory Status: Room air     Functional Mobility:  Bed Mobility:     Supine to Sit: moderate assistance; with HOB elevated and increased time to complete  Transfers:     Sit to Stand:  moderate assistance x2 progress to MOD Ax1 with rolling walker  Bed to Chair: minimum assistance with  rolling walker  using  Step Transfer  Gait: ~15-20ft with use of RW and MIN A; assist with coordination of use of RW; slow gait pattern and limited BLE step length/height      AM-PAC 6 CLICK MOBILITY  Turning over in bed (including adjusting bedclothes, sheets and blankets)?: 3  Sitting down on and standing up from a chair with arms (e.g., wheelchair, bedside commode, etc.): 2  Moving from lying on back to sitting on the side of the bed?: 2  Moving to and from a bed to a chair (including a wheelchair)?: 3  Need to walk in hospital room?: 3  Climbing 3-5 steps with a railing?: 1  Basic Mobility Total Score: 14       Treatment & Education:  Pt performs seated BLE kicks EOB x10 each; assisted with RLE to increase ROM prior to performing transfers to standing.   BP in supine with HOB elevated 134/60, sitting /66, standing 170/84 and in sitting post ambulation 159/71; no dizziness during session.    Verbal cues for proper BLE stepping pattern and safe use of RW; as well as proper hand/foot positioning prior to transfer to standing.   Pt educated to continue performing BLE ankle pumps for DVT prevention.   Pt educated on use of call button; pt understanding.     Patient left up in chair with all lines intact, call button in reach, chair alarm on, nurse notified, and sister present.    GOALS:   Multidisciplinary Problems       Physical Therapy Goals          Problem: Physical  Therapy    Goal Priority Disciplines Outcome Interventions   Physical Therapy Goal     PT, PT/OT Progressing    Description: Goals to be met by: 25     Patient will increase functional independence with mobility by performin. Supine to sit with Stand-by Assistance  2. Sit to supine with Stand-by Assistance  3. Sit to stand transfer with Stand-by Assistance with use of RW.  4. Bed to chair transfer with Stand-by Assistance using Rolling Walker  5. Gait  x 50 feet with Stand-by Assistance using Rolling Walker.   6. Ascend/descend 5 stair with right Handrails Minimal Assistance using Rolling Walker.                          Time Tracking:     PT Received On: 25  PT Start Time: 1058     PT Stop Time: 1128  PT Total Time (min): 30 min With OT    Billable Minutes: Gait Training 15 and Therapeutic Activity 10    Treatment Type: Treatment  PT/PTA: PT     Number of PTA visits since last PT visit: 0     2025

## 2025-05-08 NOTE — PROGRESS NOTES
[facility] Progress Note     In brief, 75 y.o. female s/p R TKA with Dr. Newman on 5/5/25.      Orthopaedic Surgeries:  1.  R TKA 5/5/25       S: No acute events overnight. Patient slow to ambulate and requiring moderate assistance with PT. Recommending Acute rehab vs. SNF. Case management consulted. Has had some low blood pressures. Pain controlled. Tolerating diet. Voiding.  O:    Temp:  [97.7 °F (36.5 °C)-98.1 °F (36.7 °C)] 98 °F (36.7 °C)  Pulse:  [66-94] 75  Resp:  [17-18] 18  SpO2:  [96 %-99 %] 96 %  BP: ()/(55-78) 109/58      MSK:     Right knee  Dressing: C/D/I  Compartments: soft and compressible   Motor Intact: EHL/FHL/GS/TA  SILT  Able to start knee ROM  Pulses:2+     Labs:  Recent Results (from the past 24 hours)   CBC with Differential    Collection Time: 05/07/25  9:30 AM   Result Value Ref Range    WBC 10.58 3.90 - 12.70 K/uL    RBC 3.07 (L) 4.00 - 5.40 M/uL    HGB 9.3 (L) 12.0 - 16.0 gm/dL    HCT 29.1 (L) 37.0 - 48.5 %    MCV 95 82 - 98 fL    MCH 30.3 27.0 - 31.0 pg    MCHC 32.0 32.0 - 36.0 g/dL    RDW 12.7 11.5 - 14.5 %    Platelet Count 220 150 - 450 K/uL    MPV 9.8 9.2 - 12.9 fL    Nucleated RBC 0 <=0 /100 WBC    Neut % 63.4 38 - 73 %    Lymph % 20.8 18 - 48 %    Mono % 12.6 4 - 15 %    Eos % 1.7 <=8 %    Basophil % 0.5 <=1.9 %    Imm Grans % 1.0 (H) 0.0 - 0.5 %    Neut # 6.71 1.8 - 7.7 K/uL    Lymph # 2.20 1 - 4.8 K/uL    Mono # 1.33 (H) 0.3 - 1 K/uL    Eos # 0.18 <=0.5 K/uL    Baso # 0.05 <=0.2 K/uL    Imm Grans # 0.11 (H) 0.00 - 0.04 K/uL   Platelet Review    Collection Time: 05/07/25  9:30 AM   Result Value Ref Range    Platelet Estimate Appears Normal     POCT glucose    Collection Time: 05/07/25 11:40 AM   Result Value Ref Range    POCT Glucose 110 70 - 110 mg/dL   POCT glucose    Collection Time: 05/07/25  4:05 PM   Result Value Ref Range    POCT Glucose 118 (H) 70 - 110 mg/dL   POCT glucose    Collection Time: 05/07/25  8:33 PM   Result Value Ref Range    POCT Glucose 145  "(H) 70 - 110 mg/dL   POCT glucose    Collection Time: 05/08/25  5:52 AM   Result Value Ref Range    POCT Glucose 77 70 - 110 mg/dL       Recent Labs     05/07/25  0930   WBC 10.58   HGB 9.3*   HCT 29.1*        Recent Labs     05/06/25  1114      K 3.6      CO2 20*   BUN 19   *     No results for input(s): "ESR", "CRP" in the last 72 hours.     Imaging:  Right knee X-ray demonstrates R knee TKA without evidence of complication     Assessment/Plan:     75 y.o. female s/p R TKA on 5/5/25    WBAT RLE  PT/OT  Regular diet  Family medicine following  [facility]  Case management consulted for SNF vs. Acute rehab      Jony Raman MD  [facility]  5/8/2025 8:03 AM   "

## 2025-05-08 NOTE — PLAN OF CARE
Pt AAOx4 with sister at bedside when care was asumed from  KAREN Brower. Pain well managed with scheduled tylenol. RLE elevated with 2 pillows. Edema  in RLE +2; reduced from previous night. Complete bedbath given and all linens changed. Plan of care was reviewed. Medications administered per MAR. Safety maintained.    Problem: Adult Inpatient Plan of Care  Goal: Optimal Comfort and Wellbeing  Outcome: Progressing     Problem: Wound  Goal: Optimal Coping  Outcome: Progressing  Goal: Improved Oral Intake  Outcome: Progressing  Goal: Optimal Pain Control and Function  Outcome: Progressing  Goal: Skin Health and Integrity  Outcome: Progressing  Goal: Optimal Wound Healing  Outcome: Progressing

## 2025-05-08 NOTE — PT/OT/SLP PROGRESS
Occupational Therapy   Treatment    Name: Luli Triplett  MRN: [id number]  Admitting Diagnosis:  Primary osteoarthritis of right knee  3 Days Post-Op    Recommendations:     Discharge Recommendations: Moderate Intensity Therapy  Discharge Equipment Recommendations:  to be determined by next level of care  Barriers to discharge:  None    Assessment:     Luli Triplett is a 75 y.o. female with a medical diagnosis of Primary osteoarthritis of right knee.  She presents with The primary encounter diagnosis was Primary osteoarthritis of right knee. A diagnosis of Arthritis of right knee was also pertinent to this visit. Performance deficits affecting function are weakness, impaired functional mobility, gait instability, impaired endurance, impaired cardiopulmonary response to activity, pain, decreased coordination, impaired balance, decreased lower extremity function, impaired self care skills, decreased ROM, edema, orthopedic precautions, impaired skin.     Rehab Prognosis:  Good; patient would benefit from acute skilled OT services to address these deficits and reach maximum level of function.       Plan:     Patient to be seen 5 x/week to address the above listed problems via self-care/home management, therapeutic activities, therapeutic exercises  Plan of Care Expires: 06/05/25  Plan of Care Reviewed with: patient, sibling    Subjective     Chief Complaint: none stated  Patient/Family Comments/goals: progress with mobility  Pain/Comfort:  Pain Rating 1:  (mild pain with mobility)  Location - Side 1: Right  Location 1: knee  Pain Addressed 1: Reposition, Cessation of Activity, Nurse notified    Objective:     Communicated with: nsnaz prior to session.  Patient found HOB elevated with PureWick, bed alarm, SCD, peripheral IV upon OT entry to room.    General Precautions: Standard, fall    Orthopedic Precautions:RLE weight bearing as tolerated  Braces: N/A  Respiratory Status: Room air     Occupational Performance:      Bed Mobility:    Patient completed Supine to Sit with moderate assistance     Functional Mobility/Transfers:  Patient completed Sit <> Stand Transfer with moderate assistance  with  rolling walker and progressing to ModA x1 with use of RW   Functional Mobility: Pt performing functional mobility increased distance this date into hallway & around bed with Mitali & use of RW.    Activities of Daily Living:  Lower Body Dressing: total assistance to jose B socks seated EOB      AMPAC 6 Click ADL: 17    Treatment & Education:  Pt agreeable to therapy this date.  No orthostatic hypotension this date, pt with no c/o dizziness.   Pt educated on proper positioning BLE prior to standing.   Pt educated on sequencing for turns in small spaces with use of RW.    05/08/25 1122 95 159/71 Abnormal  Sitting 102 EC   05/08/25 1120 -- 170/84 Abnormal  Standing -- EC   05/08/25 1112 96 170/84 Abnormal  Standing 116 EC   05/08/25 1106 86 144/66 Abnormal  Sitting 95 EC   05/08/25 1104 86 134/60 Lying 87 EC       Patient left up in chair with all lines intact, call button in reach, chair alarm on, and sister present & nsg notified    GOALS:   Multidisciplinary Problems       Occupational Therapy Goals          Problem: Occupational Therapy    Goal Priority Disciplines Outcome Interventions   Occupational Therapy Goal     OT, PT/OT Ongoing    Description: Goals to be met by: 06/05/2025     Patient will increase functional independence with ADLs by performing:    Toileting from toilet with Stand-by Assistance for hygiene and clothing management.   Supine to sit with Stand-by Assistance.  Step transfer with Contact Guard Assistance  Toilet transfer to toilet with Contact Guard Assistance.                           Time Tracking:     OT Date of Treatment: 05/08/25  OT Start Time: 1058  OT Stop Time: 1125  OT Total Time (min): 27 min    Billable Minutes:Therapeutic Activity 23 cotx with PT    OT/CHARLA: OT     Number of CHARLA visits since last OT  visit: 0    5/8/2025

## 2025-05-08 NOTE — PROGRESS NOTES
me Relationship Specialty Phone Fax Address Order                Mihaela Dasilva PA-C  Orthopedic Surgery [phone] [address] [place] [address]     Next Steps: Follow up on 5/20/2025  Instructions: 8:15 a xray 9:00 am appointment with Ms. Dasilva/Ortho        *[facility]  Inpatient Rehabilitation Facility [phone] [address], [address]LO[address] [place] [address]     Next Steps: Follow up  Instructions: Rehab            Close

## 2025-05-08 NOTE — PLAN OF CARE
CM met with pt  - sister Jose at bedside   Confirmed with pt that she is open to placement post discharge -   Pt with cont'd Orthostatic Htn - therapy recc post acute placement for pt.     Referrals sent per Epic.   Pt expressed that she does not want to go to a NH setting.    Future Appointments   Date Time Provider Department Center   5/20/2025  8:15 AM [facility] ORTHO CLINIC LIMIT 350LBS [facility] ORTHXR [facility]   5/20/2025  9:00 AM Mihaela Dasilva PA-C [facility] [id number] [facility]          05/08/25 1015   Post-Acute Status   Post-Acute Authorization Placement   Post-Acute Placement Status Referrals Sent   Discharge Plan   Discharge Plan A Rehab   Discharge Plan B Skilled Nursing Facility

## 2025-05-08 NOTE — PLAN OF CARE
[facility]    FACILITY TRANSFER ORDERS      Patient Name: Luli Triplett  YOB: 1950    PCP: No, Primary Doctor   PCP Address: None  PCP Phone Number: None  PCP Fax: None    Encounter Date: 05/08/2025    Admit to: [facility]    Vital Signs:  Routine    Diagnoses:   Active Hospital Problems    Diagnosis  POA    Primary osteoarthritis of right knee [M17.11]  Yes      Resolved Hospital Problems   No resolved problems to display.       Allergies:  Review of patient's allergies indicates:   Allergen Reactions    Asa [aspirin]     Pcn [penicillins]     Codeine Nausea Only     Reaction to codeine in codeine in cough medicine. Nausea only.  Patient can tolerate Percocet.       Diet: regular diet    Activities: Activity as tolerated    Goals of Care Treatment Preferences:  NA      Nursing: Per facility     Labs: Per facility     CONSULTS:    Physical Therapy to evaluate and treat.  and Occupational Therapy to evaluate and treat.    MISCELLANEOUS CARE:  None    WOUND CARE ORDERS  Keep dressing to right knee clean, dry, intact     Medications: Review discharge medications with patient and family and provide education.         Medication List        START taking these medications      acetaminophen 325 MG tablet  Commonly known as: TYLENOL  Take 1 tablet (325 mg total) by mouth every 4 (four) hours for 14 days     aspirin 81 MG EC tablet  Commonly known as: ECOTRIN  Take 1 tablet (81 mg total) by mouth 2 (two) times a day.     diclofenac 75 MG EC tablet  Commonly known as: VOLTAREN  Take 1 tablet (75 mg total) by mouth 2 (two) times daily.     famotidine 20 MG tablet  Commonly known as: PEPCID  Take 1 tablet (20 mg total) by mouth 2 (two) times daily.     JOURNAVX 50 mg Tab  Generic drug: suzetrigine  Take 2 tablets by mouth as soon as you get home. The next morning start taking 1 tablet every 12 hours.     tranexamic acid 650 mg tablet  Commonly known as: LYSTEDA  Take 1 tablet (650 mg total)  by mouth 2 (two) times daily. for 14 days            CONTINUE taking these medications      atorvastatin 40 MG tablet  Commonly known as: LIPITOR  Take 40 mg by mouth once daily.     calcium carbonate 600 mg calcium (1,500 mg) Tab  Commonly known as: OS-PARAS  Take 600 mg by mouth 2 (two) times daily with meals.     cetirizine 10 MG tablet  Commonly known as: ZYRTEC  Take 10 mg by mouth once daily.     hydroCHLOROthiazide 12.5 MG Tab  Take 12.5 mg by mouth once daily.     lisinopriL 10 MG tablet  Take 10 mg by mouth once daily.     meclizine 25 mg tablet  Commonly known as: ANTIVERT  Take 1 tablet (25 mg total) by mouth 3 (three) times daily as needed.     metFORMIN 500 MG tablet  Commonly known as: GLUCOPHAGE  Take 500 mg by mouth daily with breakfast.     ONE-A-DAY WOMEN'S 50+ ORAL  Take by mouth.     triamterene-hydrochlorothiazide 37.5-25 mg 37.5-25 mg per capsule  Commonly known as: DYAZIDE  Take 1 capsule by mouth every morning.            ASK your doctor about these medications      cephALEXin 500 MG capsule  Commonly known as: KEFLEX  Take 1 capsule (500 mg total) by mouth every 6 (six) hours for 1 day  Ask about: Should I take this medication?                Immunizations Administered as of 5/8/2025       No immunizations on file.            Some patients may experience side effects after vaccination.  These may include fever, headache, muscle or joint aches.  Most symptoms resolve with 24-48 hours and do not require urgent medical evaluation unless they persist for more than 72 hours or symptoms are concerning for an unrelated medical condition.          _________________________________  Jony Raman MD  05/08/2025

## 2025-05-08 NOTE — PLAN OF CARE
"Pt accepted at [facility]   Per Hyacinth Mayes:  "nursing call report to [phone] or charge nurse at [phone] around 2:30 and please set up transport  for a 3 PM pick please. Transport will go to [address] [place] [address] to the 4th floor nurses station "    Pt's sister Ms. Bacon at bedside.       Van requested for .    f/u apts in place:    Future Appointments   Date Time Provider Department Center   5/20/2025  8:15 AM [facility] ORTHO CLINIC LIMIT 350LBS [facility] ORTHXR [place] [facility]   5/20/2025  9:00 AM Mihaela Dasilva PA-C [id number] [place] [facility]     Pt choice form signed        05/08/25 1432   Final Note   Assessment Type Final Discharge Note   Anticipated Discharge Disposition Rehab  ([facility])   What phone number can be called within the next 1-3 days to see how you are doing after discharge? [phone]   Hospital Resources/Appts/Education Provided Appointments scheduled and added to AVS;Post-Acute resouces added to AVS   Post-Acute Status   Post-Acute Authorization Placement   Post-Acute Placement Status Set-up Complete/Auth obtained   Discharge Delays None known at this time       "

## 2025-05-08 NOTE — PT/OT/SLP PROGRESS
Physical Therapy Treatment    Patient Name:  Luli Triplett   MRN:  [id number]    Recommendations:     Discharge Recommendations: Moderate Intensity Therapy  Discharge Equipment Recommendations: to be determined by next level of care  Barriers to discharge: decreased functional mobility/strength    Assessment:     Luli Triplett is a 75 y.o. female admitted with a medical diagnosis of Primary osteoarthritis of right knee.  She presents with the following impairments/functional limitations: weakness, impaired endurance, impaired self care skills, impaired functional mobility, impaired balance, gait instability, pain, decreased lower extremity function, decreased ROM, impaired skin, edema, impaired coordination, orthopedic precautions Pt would continue to benefit from P.T. To address impairments listed above.  .    Rehab Prognosis: Fair; patient would benefit from acute skilled PT services to address these deficits and reach maximum level of function.    Recent Surgery: Procedure(s) (LRB):  ARTHROPLASTY, KNEE, SIGHT ASSISTED (Right) 3 Days Post-Op    Plan:     During this hospitalization, patient to be seen BID to address the identified rehab impairments via therapeutic activities, gait training, therapeutic exercises, neuromuscular re-education and progress toward the following goals:    Plan of Care Expires:  06/05/25    Subjective     Patient/Family Comments/goals: Pt agreed to tx  Pain/Comfort:  Pain Rating 1: 0/10  Pain Addressed 1: Pre-medicate for activity, Reposition  Pain Rating Post-Intervention 1: 1/10 (R knee)      Objective:     Communicated with RN prior to session.  Patient found HOB elevated with bed alarm, peripheral IV, PureWick upon PT entry to room.     General Precautions: Standard, fall  Orthopedic Precautions: RLE weight bearing as tolerated  Braces: N/A  Respiratory Status: Room air     Functional Mobility:  Bed Mobility:     Scooting: minimum assistance and to EOB  Supine to Sit: minimum  assistance and to support RLE while pt scooted to EOB via long sitting  Sit to Supine: minimum assistance and RLE onto EOB  Transfers:     Sit to Stand:  moderate assistance with rolling walker and Hand placement.  Pt instructed to place RLE out in front and reach back with L hand prior to sitting to decreased w/britta/pain on R knee.  Pt able to stand with B feet on floor with B knees flexed.  Gait: 32ft with RW and CGA with vc's to push RW forward more for improved step length.  Pt was able to follow and ambulated with 3pt gait.  Decreased anuel, trunk flexion, 3pt gait with 2 bouts of mild unsteadiness without LOB>  Balance: sitting good-, standing fair Rw, gait fair RW      AM-PAC 6 CLICK MOBILITY  Turning over in bed (including adjusting bedclothes, sheets and blankets)?: 3  Sitting down on and standing up from a chair with arms (e.g., wheelchair, bedside commode, etc.): 2  Moving from lying on back to sitting on the side of the bed?: 3  Moving to and from a bed to a chair (including a wheelchair)?: 3  Need to walk in hospital room?: 3  Climbing 3-5 steps with a railing?: 1  Basic Mobility Total Score: 15       Treatment & Education:  Pt education on goals for tx today.  Pt sat EOB and performed R knee flexion/ext  x 10 reps with foot on towel on floor to assist with sliding.    Gait training and bed mobility as above.  RLE elevated on pillow with heel floating off for pressure relief at end of tx.  No c/o dizziness during tx.    Patient left HOB elevated with all lines intact, call button in reach, bed alarm on, and RN notified..    GOALS:   Multidisciplinary Problems       Physical Therapy Goals          Problem: Physical Therapy    Goal Priority Disciplines Outcome Interventions   Physical Therapy Goal     PT, PT/OT Progressing    Description: Goals to be met by: 25     Patient will increase functional independence with mobility by performin. Supine to sit with Stand-by Assistance  2. Sit to  supine with Stand-by Assistance  3. Sit to stand transfer with Stand-by Assistance with use of RW.  4. Bed to chair transfer with Stand-by Assistance using Rolling Walker  5. Gait  x 50 feet with Stand-by Assistance using Rolling Walker.   6. Ascend/descend 5 stair with right Handrails Minimal Assistance using Rolling Walker.                              Time Tracking:     PT Received On: 05/08/25  PT Start Time: 1430     PT Stop Time: 1457  PT Total Time (min): 27 min     Billable Minutes: Gait Training 12 and Therapeutic Activity 15    Treatment Type: Treatment  PT/PTA: PTA     Number of PTA visits since last PT visit: 1 05/08/2025

## 2025-05-08 NOTE — PLAN OF CARE
Problem: Physical Therapy  Goal: Physical Therapy Goal  Description: Goals to be met by: 25     Patient will increase functional independence with mobility by performin. Supine to sit with Stand-by Assistance  2. Sit to supine with Stand-by Assistance  3. Sit to stand transfer with Stand-by Assistance with use of RW.  4. Bed to chair transfer with Stand-by Assistance using Rolling Walker  5. Gait  x 50 feet with Stand-by Assistance using Rolling Walker.   6. Ascend/descend 5 stair with right Handrails Minimal Assistance using Rolling Walker.     Outcome: Progressing     PT/OT co-session to safely progress pt's functional mobility. Pt with no reported dizziness during mobility; BP monitored. Pt participates in bed mobility, seated BLE there-ex, transfers to standing and ambulation with use of RW.

## 2025-05-09 NOTE — DISCHARGE SUMMARY
[facility]  Discharge Note     Admitting/Discharge Physician: Dr. Amos Newman     Hospital Course: Patient is a 75 year old female who came to the hospital on 5/5/25 for scheduled R TKA. She underwent the procedure without complications. She worked with PT after surgery and they determined she would best be fit for acute rehab upon discharge. She was medically stabilized and discharged on 5/8/25.      OUTCOME: Patient tolerated treatment/procedure well without complication and is now ready for discharge.     DISPOSITION: Rehab Facility     FINAL DIAGNOSIS:  Primary osteoarthritis of right knee     FOLLOWUP: In clinic     DISCHARGE INSTRUCTIONS:  Post Op Total Knee Arthroplasty Instructions      1. Enteric coated aspirin 81 mg by mouth twice a day for 6 weeks to prevent blood clots,  unless otherwise indicated.  2. Please take a stomach reflux medication such as pepcid, prevacid, nexium (H-2 blocker or PPI) while on aspirin to prevent stomach ulcers. You will be given a prescription for pepcid.  3. Poli stockings should be worn as much as possible for 6 weeks to prevent blood clots.  4. Do not start antibiotics for any suspected infections related to the surgery until evaluated by dr galloway staff  5. No driving for approximately 2-4 weeks  6. You can shower once the incision is completely dry, otherwise place a new dry dressing twice a day if there is drainage. Please call the office if the drainage increases after discharge.  7. You may resume all pre-surgery medications unless otherwise indicated  8. All patients should be seen in Dr Galloway office approx 2 weeks after surgery  9. Dr Newman prefers outpatient physical therapy upon discharge home. If home PT is needed, please contact Dr Newman for approval  10. Patients should see their primary care doctor after discharge home

## 2025-05-20 ENCOUNTER — OFFICE VISIT (OUTPATIENT)
Dept: ORTHOPEDICS | Facility: CLINIC | Age: 75
End: 2025-05-20
Payer: MEDICARE

## 2025-05-20 VITALS — WEIGHT: 202.19 LBS | HEIGHT: 62 IN | BODY MASS INDEX: 37.21 KG/M2

## 2025-05-20 DIAGNOSIS — Z96.651 AFTERCARE FOLLOWING RIGHT KNEE JOINT REPLACEMENT SURGERY: Primary | ICD-10-CM

## 2025-05-20 DIAGNOSIS — Z47.1 AFTERCARE FOLLOWING RIGHT KNEE JOINT REPLACEMENT SURGERY: Primary | ICD-10-CM

## 2025-05-20 PROCEDURE — 99024 POSTOP FOLLOW-UP VISIT: CPT | Mod: POP,,, | Performed by: PHYSICIAN ASSISTANT

## 2025-05-20 PROCEDURE — 99999 PR PBB SHADOW E&M-EST. PATIENT-LVL III: CPT | Mod: PBBFAC,,, | Performed by: PHYSICIAN ASSISTANT

## 2025-05-20 PROCEDURE — 99213 OFFICE O/P EST LOW 20 MIN: CPT | Mod: PBBFAC,PN | Performed by: PHYSICIAN ASSISTANT

## 2025-05-20 NOTE — PROGRESS NOTES
Subjective:      Chief Complaint: Pain and Post-op Evaluation of the Right Knee (S/P R TKA 5/5/25)    Patient ID: Luli Triplett is a 75 y.o. female.  Patient is 2 weeks s/p  right primary total knee replacement  Anterior knee pain: No  Has improved pain  Is in physical therapy  home health   Problems w incision  No  Is  happy with result  Yes  Opiod free: Yes    Was discharged to acute rehab facility.  She was doing physical therapy there.  She states that she was discharged from that facility yesterday.  They ordered home health PT for her.  She is not complaining of any pain today.  She is using a walker.  Takes Tylenol as needed for pain.        Past Medical History:   Diagnosis Date    High cholesterol     Hypertension     Macular degeneration         Past Surgical History:   Procedure Laterality Date    ARTHROPLASTY, KNEE, TOTAL, SIGHT ASSISTED Right 5/5/2025    Procedure: ARTHROPLASTY, KNEE, SIGHT ASSISTED;  Surgeon: Amos Newman MD;  Location: [facility];  Service: Orthopedics;  Laterality: Right;  bmi - 37.06    HYSTERECTOMY          Current Outpatient Medications   Medication Instructions    aspirin (ECOTRIN) 81 mg, Oral, 2 times daily    atorvastatin (LIPITOR) 40 mg, Daily    calcium carbonate (OS-PARAS) 600 mg, 2 times daily with meals    cetirizine (ZYRTEC) 10 mg, Daily    diclofenac (VOLTAREN) 75 mg, Oral, 2 times daily    famotidine (PEPCID) 20 mg, Oral, 2 times daily    hydroCHLOROthiazide 12.5 mg, Daily    lisinopriL 10 mg, Daily    meclizine (ANTIVERT) 25 mg, Oral, 3 times daily PRN    metFORMIN (GLUCOPHAGE) 500 mg, With breakfast    MULTIVIT-MIN/FA/CALCIUM/VIT K1 (ONE-A-DAY WOMEN'S 50+ ORAL) Take by mouth.    suzetrigine 50 mg Tab Take 2 tablets by mouth as soon as you get home. The next morning start taking 1 tablet every 12 hours.    triamterene-hydrochlorothiazide 37.5-25 mg (DYAZIDE) 37.5-25 mg per capsule 1 capsule, Every morning        Review of patient's allergies indicates:   Allergen  "Reactions    Asa [aspirin]     Pcn [penicillins]     Codeine Nausea Only     Reaction to codeine in codeine in cough medicine. Nausea only.  Patient can tolerate Percocet.       Review of Systems   Constitutional: Negative for fever and malaise/fatigue.   Eyes:  Negative for blurred vision.   Cardiovascular:  Negative for chest pain.   Respiratory:  Negative for shortness of breath.    Skin:  Negative for poor wound healing.   Musculoskeletal:  Positive for joint pain, muscle weakness and myalgias.   Genitourinary:  Negative for bladder incontinence.   Neurological:  Negative for dizziness, numbness and paresthesias.   Psychiatric/Behavioral:  Negative for altered mental status.        The patient's relevant past medical, surgical, and social history was reviewed in Epic.       Objective:      VITAL SIGNS: Ht 5' 2" (1.575 m)   Wt 91.7 kg (202 lb 2.6 oz)   BMI 36.98 kg/m²     General    Nursing note and vitals reviewed.  Constitutional: She is oriented to person, place, and time. She appears well-developed and well-nourished.   Neurological: She is alert and oriented to person, place, and time.     General Musculoskeletal Exam   Gait: antalgic       Right Knee Exam     Inspection   Scars: present  Swelling: present    Tenderness   The patient is experiencing no tenderness.     Range of Motion   Extension:  5   Flexion:  70     Tests   Ligament Examination   MCL - Valgus: normal (0 to 2mm)  LCL - Varus: normal  Patella   Passive Patellar Tilt: neutral    Other   Sensation: normal    Comments:  ROM 5-65  Skin intact, dry.  Incision healing well with Brijjit closure system in place  No redness or drainage noted.    Muscle Strength   Right Lower Extremity   Quadriceps:  4/5   Hamstrin/5     Vascular Exam     Right Pulses  Dorsalis Pedis:      2+  Posterior Tibial:      2+        Edema  Right Lower Leg: present       Imaging          Assessment:       Llui Triplett is a 75 y.o. female seen in the office today " for   1. Aftercare following right knee joint replacement surgery     2 week s/p right TKA .  Significant stiffness noted on today's exam.  She is starting home health this week because she does not have a ride to outpatient therapy for this week only.  I will contact the physical therapist at the [facility] to get her started for outpatient physical therapy starting next week.  I will have her come back in about a month for range-of-motion check.  Encouraged home exercises that she learned at the acute rehab facility.  Continue Tylenol as needed for pain.  Elevation or Poli hose to help with swelling.  Follow-up in 1 month.      Plan:       Luli was seen today for pain and post-op evaluation.    Diagnoses and all orders for this visit:    Aftercare following right knee joint replacement surgery          Diagnoses and plan discussed with the patient, as well as the expected course and duration of his symptoms.  All questions and concerns were addressed prior to the end of the visit.   Instructed patient to call office if they have any future questions/concerns or to schedule apt. Patient will return to see me if symptoms worsen or fail to improve    Note dictated with voice recognition software, please excuse any grammatical errors.        Mihaela Dasilva PA-C      Department of Orthopedic Surgery  [facility]  Office: [phone]  05/20/2025

## 2025-05-23 ENCOUNTER — CLINICAL SUPPORT (OUTPATIENT)
Dept: REHABILITATION | Facility: HOSPITAL | Age: 75
End: 2025-05-23
Payer: MEDICARE

## 2025-05-23 DIAGNOSIS — M25.661 DECREASED RANGE OF MOTION (ROM) OF RIGHT KNEE: Primary | ICD-10-CM

## 2025-05-23 PROCEDURE — 97161 PT EVAL LOW COMPLEX 20 MIN: CPT

## 2025-05-23 PROCEDURE — 97112 NEUROMUSCULAR REEDUCATION: CPT

## 2025-05-26 ENCOUNTER — CLINICAL SUPPORT (OUTPATIENT)
Dept: REHABILITATION | Facility: HOSPITAL | Age: 75
End: 2025-05-26
Payer: MEDICARE

## 2025-05-26 DIAGNOSIS — M25.661 DECREASED RANGE OF MOTION (ROM) OF RIGHT KNEE: Primary | ICD-10-CM

## 2025-05-26 PROCEDURE — 97014 ELECTRIC STIMULATION THERAPY: CPT

## 2025-05-26 PROCEDURE — 97112 NEUROMUSCULAR REEDUCATION: CPT

## 2025-05-26 NOTE — PROGRESS NOTES
Outpatient Rehab    Physical Therapy Visit    Patient Name: Luli Triplett  MRN: [id number]  YOB: 1950  Encounter Date: 5/26/2025    Therapy Diagnosis:   Encounter Diagnosis   Name Primary?    Decreased range of motion (ROM) of right knee Yes     Physician: Amos Newman MD    Physician Orders: Eval and Treat  Medical Diagnosis: Pain in right knee  Unilateral primary osteoarthritis, right knee  Muscle weakness (generalized)    Visit # / Visits Authorized:  1 / 20  Insurance Authorization Period: 4/29/2025 to 12/31/2025  Date of Evaluation: 5/23/2025  Plan of Care Certification: 5/26/2025 to 7/7/2025      PT/PTA: PT   Number of PTA visits since last PT visit:0  Time In: 0955   Time Out: 1055  Total Time (in minutes): 60   Total Billable Time (in minutes): 60    FOTO:  Intake Score:  %  Survey Score 2:  %  Survey Score 3:  %    Precautions:       Subjective   patient reports that she started to wear her own compression socks to help with swelling. She mentioned that she was nervous about how bad her swelling was yesterday so she was really trying to get her leg elevated..  Pain reported as 3/10.      Objective            Treatment:  Balance/Neuromuscular Re-Education  NMR 1: Patient education: HEP compliance, swelling management, ROM expectations post TKA, importance of extension ROM for proper gait cycle  NMR 2: Russian NMES 10 sec on/10 sec off including: quad sets with towel roll x 10 minutes, SAQs with medium bolster x 10 minutes, SAQs with 1/2 foam x 10 minutes, LAQs at EOM x 0 minutes  NMR 3: Heel prop to improve tissue extensibility and fascilitate quad activation x 5 minutes (green strap for positioning)  NMR 4: Heel slides to improve tissue extensibility and ROM: 10 second hold, 5'  NMR 5: Ambulation with fww in clinic with emphasis on proper gait cycle.    Time Entry(in minutes):  E-Stim (Unattended) Time Entry: 30  Neuromuscular Re-Education Time Entry: 60    Assessment & Plan   Assessment:  Patient presents for first follow up and initiated nmes this date for quadriceps activation. Patient with significant swelling this date and discussed importance of elevation above the level of her heart and appropriate compression. Knee rom measured 88 degrees AA and therapist overpressure. flexion rom compromised due to significant swelling.       The patient will continue to benefit from skilled outpatient physical therapy in order to address the deficits listed in the problem list on the initial evaluation, provide patient and family education, and maximize the patients level of independence in the home and community environments.     The patient's spiritual, cultural, and educational needs were considered, and the patient is agreeable to the plan of care and goals.           Plan:      Goals:   Active       Ambulation/movement       Patient will perform TUG in < 13 seconds with least restrictive assisive device to reduce risk for falls.  (Ongoing)       Start:  05/23/25    Expected End:  07/07/25            Patient will ascend at least 5 steps for stair navigation  (Ongoing)       Start:  05/23/25    Expected End:  07/07/25            Patient will descend at least 5 steps for stair navigation  (Ongoing)       Start:  05/23/25    Expected End:  07/07/25               Functional outcome       Patient will show a significant change in FOTO patient-reported outcome tool to demonstrate subjective improvement (Ongoing)       Start:  05/23/25    Expected End:  07/07/25            Patient stated goal: get back to normal activities  (Ongoing)       Start:  05/23/25    Expected End:  07/07/25            Patient will demonstrate independence in home program for support of progression (Ongoing)       Start:  05/23/25    Expected End:  07/07/25               Pain       Patient will report pain of < 2/10 demonstrating a reduction of overall pain (Ongoing)       Start:  05/23/25    Expected End:  07/07/25            Patient  will report a 2 point reduction in pain while performing prolonged walking (Ongoing)       Start:  05/23/25    Expected End:  07/07/25               Range of Motion       Patient will achieve right knee ROM of  0-110 degrees  (Ongoing)       Start:  05/23/25    Expected End:  07/07/25               Strength       Patient will achieve right knee extension strength of 4/5 (Ongoing)       Start:  05/23/25    Expected End:  07/07/25                Marlin Ramirez, PT

## 2025-05-26 NOTE — PROGRESS NOTES
Outpatient Rehab    Physical Therapy Evaluation Post Op    Patient Name: Luli Triplett  MRN: [id number]  YOB: 1950  Encounter Date: 5/23/2025    Therapy Diagnosis:   Encounter Diagnosis   Name Primary?    Decreased range of motion (ROM) of right knee Yes     Physician: Amos Newman MD    Physician Orders: Eval and Treat  Medical Diagnosis: Chronic pain of right knee  Primary osteoarthritis of right knee  Quadriceps weakness    Visit # / Visits Authorized:  1 / 1  Insurance Authorization Period: 3/20/2025 to 3/20/2026  Date of Evaluation: 5/23/2025  Plan of Care Certification: 5/23/2025 to 7/7/2025     Time In: 1000   Time Out: 1100  Total Time (in minutes): 60   Total Billable Time (in minutes): 60    Intake Outcome Measure for FOTO Survey    Therapist reviewed FOTO scores for Luli Triplett on 5/23/2025.   FOTO report - see Media section or FOTO account episode details.     Intake Score:  %    Precautions:       Subjective   History of Present Illness  Luli is a 75 y.o. female who reports to physical therapy with a chief concern of right knee pain.     The patient reports a medical diagnosis of Chronic pain of right knee (M25.561, G89.29), Primary osteoarthritis of right knee (M17.11), Quadriceps weakness (M62.81).    Diagnostic tests related to this condition: X-ray.        History of Present Condition/Illness: Patient reports to the clinic s/p 2 weeks, 4 days right total knee arthroplasty by Dr. Newman. Patient with complicated post op recovery due to BP issues post operatively which required patient to spend initial therapy at Inpatient rehab from the 8th-19th.  Patient lives at home with her sister. Prior to surgery patient, was ambulating with front wheeled walker for community ambulation and single point cane and independent for household ambulation. Patient reports that she has stairs outside of her house. She states that she has been having a lot of swelling but having a difficult time  with elevating high enough at home.     Activities of Daily Living  Social history was obtained from Patient.    General Prior Level of Function Comments: mod I with fww  General Current Level of Function Comments: mod I with fww           Pain     Patient reports a current pain level of 2/10. Pain at best is reported as 2/10. Pain at worst is reported as 2/10.   Location: right knee  Clinical Progression (since onset): Stable  Pain Qualities: Aching, Dull  Pain-Relieving Factors: Rest, Ice, Medications - over-the-counter, Medications - prescription, Elevation  Pain-Aggravating Factors: Walking, Standing, Straightening, Stair climbing, Squatting, Sitting         Treatment History  Treatments  Discharged From Past 30 Days: Inpatient rehab facility    Living Arrangements  Living Situation  Housing: Home independently  Living Arrangements: Family members  Support Systems: Family members    Home Setup  Type of Structure: House        Employment  Employment Status: Retired          Past Medical History/Physical Systems Review:   Luli Triplett  has a past medical history of High cholesterol, Hypertension, and Macular degeneration.    Luli Triplett  has a past surgical history that includes Hysterectomy and arthroplasty, knee, total, sight assisted (Right, 5/5/2025).    Luli has a current medication list which includes the following prescription(s): aspirin, atorvastatin, calcium carbonate, cetirizine, diclofenac, famotidine, hydrochlorothiazide, lisinopril, meclizine, metformin, mv-mn/folic ac/calcium/vit k1, suzetrigine, and triamterene-hydrochlorothiazide 37.5-25 mg.    Review of patient's allergies indicates:   Allergen Reactions    Asa [aspirin]     Pcn [penicillins]     Codeine Nausea Only     Reaction to codeine in codeine in cough medicine. Nausea only.  Patient can tolerate Percocet.        Objective          Observation: Patient presents to the clinic using front wheeled walker. Post-op dressing in  place without concern.     Gait: Ambulating with front wheeled walker with decreased step and stride length, knee flexion during stance phase, and hip circumduction during swing phase. Education given on proper heel-toe gait pattern and use of front wheeled walker.      Range of Motion:   Knee Right Active Left Active   Flexion Pre: 62 degrees  Post: 84 degrees AAROM 98 degrees   Extension Pre: Lacking 2 degrees  Post: 0 degrees 0 degrees     Lower Extremity Strength:  Formal MMT not performed secondary to POD#18 and increased pain. Poor quad activation noted in right lower extremity and unable to perform straight leg raise without extensor lag.     Endurance Assessment:    Evaluation   Timed Up and Go 26 seconds with front wheeled walker    30 sec STS  5x with bilateral upper extremity assist        Functional tests:               DL Squat: Not performed 2/2 post-op.              Step-down: Not performed 2/2 post-op.              SLS EO: Not performed 2/2 post-op.              SLS EC: Not performed 2/2 post-op.    Joint Mobility: hypomobile as expected post-op.     Palpation: Tenderness as expected post-op     Sensation: Intact     Flexibility: Grossly hypomobile quad, hamstring and gastroc as expected on post-op right lower extremity      Edema: As expected post-op    Treatment:  Balance/Neuromuscular Re-Education  NMR 1: Education on POC, HEP, TKA protocol, transfer safety, TJL number, fww ambulation, gait mechanics, post op recovery with swelling, DVT risks and s/s  NMR 2: Heel slides 5' x 2 rounds  NMR 3: Heel prop 5'  NMR 4: Quad sets 10 sec hold x 30 reps  NMR 5: SAQ 5 sec hold x 20 reps (1/2 foam and white foam)    Time Entry(in minutes):  PT Evaluation (Low) Time Entry: 20  Neuromuscular Re-Education Time Entry: 40    Assessment & Plan   Assessment  Luli presents with a condition of Low complexity.   Presentation of Symptoms: Stable  Will Comorbidities Impact Care: No       Functional Limitations:  Activity tolerance, Completing self-care activities, Proprioception, Range of motion, Participating in leisure activities, Pain with ADLs/IADLs, Gross motor coordination, Painful locomotion/ambulation, Gait limitations, Functional mobility  Impairments: Pain with functional activity, Impaired physical strength, Lack of appropriate home exercise program, Abnormal or restricted range of motion, Activity intolerance  Personal Factors Affecting Prognosis: Pain, Transportation, Schedule    Patient Goal for Therapy (PT): get back to normal activities  Prognosis: Fair  Assessment Details: Patient is a 75 year old female who presents to the clinic > 14 days s/p right total knee arthroplasty. Patient demonstrates decreased range of motion, strength, and quadriceps motor control. Patient largely limited with knee flexion range of motion at this time and will remain strong focus. Patient is functionally limited with standing, walking, bending, lifting, and stairs. Patient will benefit from skilled therapy services to address deficits and return to prior level of function.     Plan  From a physical therapy perspective, the patient would benefit from: Skilled Rehab Services    Planned therapy interventions include: Therapeutic exercise, Therapeutic activities, Neuromuscular re-education, Manual therapy, ADLs/IADLs, and Gait training.    Planned modalities to include: Biofeedback, Electrical stimulation - attended, Electrical stimulation - passive/unattended, Thermotherapy (hot pack), and Cryotherapy (cold pack).        Visit Frequency: 3 times Per Week for 6 Weeks.       This plan was discussed with Patient.   Discussion participants: Agreed Upon Plan of Care  Plan details: Frequency and duration of treatment to be adjusted as needed          The patient's spiritual, cultural, and educational needs were considered, and the patient is agreeable to the plan of care and goals.     Education  Education was done with Patient. The  patient's learning style includes Demonstration, Listening, and Pictures/video. The patient Verbalizes understanding and Demonstrates understanding.                 Goals:   Active       Ambulation/movement       Patient will perform TUG in < 13 seconds with least restrictive assisive device to reduce risk for falls.        Start:  05/23/25    Expected End:  07/07/25            Patient will ascend at least 5 steps for stair navigation        Start:  05/23/25    Expected End:  07/07/25            Patient will descend at least 5 steps for stair navigation        Start:  05/23/25    Expected End:  07/07/25               Functional outcome       Patient will show a significant change in FOTO patient-reported outcome tool to demonstrate subjective improvement       Start:  05/23/25    Expected End:  07/07/25            Patient stated goal: get back to normal activities        Start:  05/23/25    Expected End:  07/07/25            Patient will demonstrate independence in home program for support of progression       Start:  05/23/25    Expected End:  07/07/25               Pain       Patient will report pain of < 2/10 demonstrating a reduction of overall pain       Start:  05/23/25    Expected End:  07/07/25            Patient will report a 2 point reduction in pain while performing prolonged walking       Start:  05/23/25    Expected End:  07/07/25               Range of Motion       Patient will achieve right knee ROM of  0-110 degrees        Start:  05/23/25    Expected End:  07/07/25               Strength       Patient will achieve right knee extension strength of 4/5       Start:  05/23/25    Expected End:  07/07/25                Marlin Ramirez, PT

## 2025-05-27 ENCOUNTER — TELEPHONE (OUTPATIENT)
Dept: ORTHOPEDICS | Facility: CLINIC | Age: 75
End: 2025-05-27
Payer: MEDICARE

## 2025-05-27 NOTE — TELEPHONE ENCOUNTER
----- Message from Jesus Manuel sent at 5/27/2025  3:43 PM CDT -----  Pt Requesting Call BackWho called: ptWho called for pt:Best call back #:  [phone] notes: pt said her Physical therapist says she needs thigh high leg stocking for people with lymphadenia being that she have been having swelling on leg she had surgery on ; says she needs to know the strength of that stocking to give to staff at supply company; says staff said it  would be best for the doctor to determine this; says staff said she does not a rx for that

## 2025-05-28 ENCOUNTER — PATIENT MESSAGE (OUTPATIENT)
Dept: ORTHOPEDICS | Facility: CLINIC | Age: 75
End: 2025-05-28
Payer: MEDICARE

## 2025-05-28 ENCOUNTER — TELEPHONE (OUTPATIENT)
Dept: ORTHOPEDICS | Facility: CLINIC | Age: 75
End: 2025-05-28
Payer: MEDICARE

## 2025-05-28 ENCOUNTER — CLINICAL SUPPORT (OUTPATIENT)
Dept: REHABILITATION | Facility: HOSPITAL | Age: 75
End: 2025-05-28
Payer: MEDICARE

## 2025-05-28 DIAGNOSIS — M25.661 DECREASED RANGE OF MOTION (ROM) OF RIGHT KNEE: Primary | ICD-10-CM

## 2025-05-28 PROCEDURE — 97014 ELECTRIC STIMULATION THERAPY: CPT | Mod: CQ

## 2025-05-28 PROCEDURE — 97112 NEUROMUSCULAR REEDUCATION: CPT | Mod: CQ

## 2025-05-28 NOTE — PROGRESS NOTES
Outpatient Rehab    Physical Therapy Visit    Patient Name: Luli Triplett  MRN: [id number]  YOB: 1950  Encounter Date: 5/28/2025    Therapy Diagnosis:   Encounter Diagnosis   Name Primary?    Decreased range of motion (ROM) of right knee Yes     Physician: Amos Newman MD    Physician Orders: Eval and Treat  Medical Diagnosis: Pain in right knee  Unilateral primary osteoarthritis, right knee  Muscle weakness (generalized)    Visit # / Visits Authorized:  2 / 20  Insurance Authorization Period: 4/29/2025 to 12/31/2025  Date of Evaluation: 5/23/2025  Plan of Care Certification: 5/26/2025 to 7/7/2025      PT/PTA: PTA   Number of PTA visits since last PT visit:1  Time In: 1230   Time Out: 1350  Total Time (in minutes): 80   Total Billable Time (in minutes): 60    FOTO:  Intake Score: 59%  Survey Score 2: 70%  Survey Score 3:  %    Precautions: None    Subjective   Patient reports a dull ache in her knee. She contacted Dr. Newman's office about the swelling and was told to purchase Lymphedema compression stockings.  Pain reported as 3/10. Right knee    Objective   Range of Motion:   Knee Right Active   Flexion Post: 90 degrees AAROM     Treatment:  Balance/Neuromuscular Re-Education  NMR 1: Patient education: HEP compliance, swelling management, ROM expectations post TKA, importance of extension ROM for proper gait cycle  NMR 2: Russian NMES 10 sec on/10 sec off including: quad sets with towel roll x 10 minutes, SAQs with medium bolster x 10 minutes, SAQs with 1/2 foam x 10 minutes  NMR 3: Heel prop to improve tissue extensibility and fascilitate quad activation x 5 minutes (green strap for positioning)  NMR 4: Heel slides to improve tissue extensibility and ROM: 10 second hold, 5 minutes  NMR 5: Ambulation with fww in clinic with emphasis on proper gait cycle.  Modalities  Cryotherapy (Minutes\Location): cold pack for 20 minutes to Right knee with Bilateral LE's elevated on wedge x 2 for swelling  reduction    Time Entry(in minutes):  E-Stim (Unattended) Time Entry: 30  Hot/Cold Pack Time Entry: 20  Neuromuscular Re-Education Time Entry: 60    Assessment & Plan   Assessment: Measured 90 degrees AA Flexion post session. Continued use of NMES for quad activation and motor control. Significant swelling observed at this time. Patient will be picking up compression stockings from [facility] within the next couple of days.  Evaluation/Treatment Tolerance: Patient tolerated treatment well    The patient will continue to benefit from skilled outpatient physical therapy in order to address the deficits listed in the problem list on the initial evaluation, provide patient and family education, and maximize the patients level of independence in the home and community environments.     The patient's spiritual, cultural, and educational needs were considered, and the patient is agreeable to the plan of care and goals.           Plan: Will continue per POC towards treatment goals. PT/PTA met face to face to discuss patient's treatment plan and progress towards established goals. Patient will be seen by physical therapist every sixth visit and minimally once per month.    Goals:   Active       Ambulation/movement       Patient will perform TUG in < 13 seconds with least restrictive assisive device to reduce risk for falls.  (Progressing)       Start:  05/23/25    Expected End:  07/07/25            Patient will ascend at least 5 steps for stair navigation  (Progressing)       Start:  05/23/25    Expected End:  07/07/25            Patient will descend at least 5 steps for stair navigation  (Progressing)       Start:  05/23/25    Expected End:  07/07/25               Functional outcome       Patient will show a significant change in FOTO patient-reported outcome tool to demonstrate subjective improvement (Progressing)       Start:  05/23/25    Expected End:  07/07/25            Patient stated goal: get back to normal  activities  (Progressing)       Start:  05/23/25    Expected End:  07/07/25            Patient will demonstrate independence in home program for support of progression (Progressing)       Start:  05/23/25    Expected End:  07/07/25               Pain       Patient will report pain of < 2/10 demonstrating a reduction of overall pain (Progressing)       Start:  05/23/25    Expected End:  07/07/25            Patient will report a 2 point reduction in pain while performing prolonged walking (Progressing)       Start:  05/23/25    Expected End:  07/07/25               Range of Motion       Patient will achieve right knee ROM of  0-110 degrees  (Progressing)       Start:  05/23/25    Expected End:  07/07/25               Strength       Patient will achieve right knee extension strength of 4/5 (Progressing)       Start:  05/23/25    Expected End:  07/07/25                Kassie Rosen, PTA

## 2025-05-28 NOTE — TELEPHONE ENCOUNTER
Spoke with patient. Advised her that the compression stockings are 20mm hg - 30mmhg. Patient verbally understand.

## 2025-05-30 ENCOUNTER — CLINICAL SUPPORT (OUTPATIENT)
Dept: REHABILITATION | Facility: HOSPITAL | Age: 75
End: 2025-05-30
Payer: MEDICARE

## 2025-05-30 DIAGNOSIS — M25.661 DECREASED RANGE OF MOTION (ROM) OF RIGHT KNEE: Primary | ICD-10-CM

## 2025-05-30 PROCEDURE — 97530 THERAPEUTIC ACTIVITIES: CPT

## 2025-05-30 PROCEDURE — 97014 ELECTRIC STIMULATION THERAPY: CPT

## 2025-05-30 PROCEDURE — 97112 NEUROMUSCULAR REEDUCATION: CPT

## 2025-05-31 NOTE — PROGRESS NOTES
Outpatient Rehab    Physical Therapy Visit    Patient Name: Luli Triplett  MRN: [id number]  YOB: 1950  Encounter Date: 5/30/2025    Therapy Diagnosis:   Encounter Diagnosis   Name Primary?    Decreased range of motion (ROM) of right knee Yes     Physician: Amos Newman MD    Physician Orders: Eval and Treat  Medical Diagnosis: Pain in right knee  Unilateral primary osteoarthritis, right knee  Muscle weakness (generalized)    Visit # / Visits Authorized:  3 / 20  Insurance Authorization Period: 4/29/2025 to 12/31/2025  Date of Evaluation: 5/23/2025  Plan of Care Certification: 5/26/2025 to 7/7/2025      PT/PTA: PT   Number of PTA visits since last PT visit:0  Time In: 1231   Time Out: 1331  Total Time (in minutes): 60   Total Billable Time (in minutes): 60    FOTO:  Intake Score:  %  Survey Score 2:  %  Survey Score 3:  %    Precautions:       Subjective   patient states that she put a bandaid near her incision because when the last bandage popped up, she had some bleeding. She reports that she is suppose to be getting some compression stockings to help with the swelling problems..  Pain reported as 3/10. Right knee    Objective            Treatment:  Balance/Neuromuscular Re-Education  NMR 1: Patient education: HEP compliance, swelling management, ROM expectations post TKA, importance of extension ROM for proper gait cycle  NMR 2: Russian NMES 10 sec on/10 sec off including: quad sets with towel roll x 10 minutes, SAQs with medium bolster x 10 minutes, SAQs with 1/2 foam x 10 minutes  NMR 3: Heel prop to improve tissue extensibility and fascilitate quad activation x 5 minutes (green strap for positioning)  NMR 4: Heel slides to improve tissue extensibility and ROM: 10 second hold, 5 minutes  NMR 5: Ambulation with fww in clinic with emphasis on proper gait cycle.  Therapeutic Activity  TA 1: NuStep 8' for gait mechanics    Time Entry(in minutes):  Neuromuscular Re-Education Time Entry:  52  Therapeutic Activity Time Entry: 8    Assessment & Plan   Assessment: Minimal change to her current interventions besides Nustep this date. Patient was able to perform saq through 2/3 ranges with fair to good quadriceps activation which is an improvement. Patient's post op course complicated by BP issues which resulted in IPR stay causing some delay regarding knee rom per protocol. Patient gravely limited with progressions due to poor swelling management and discussed importance of proper swelling management this date. Patient not performing heel slides at home which could be a factor in her recovery. Knee rom measured 0 degrees extension + 88 degrees Active flexion and 90 degrees AA with overpressure.       The patient will continue to benefit from skilled outpatient physical therapy in order to address the deficits listed in the problem list on the initial evaluation, provide patient and family education, and maximize the patients level of independence in the home and community environments.     The patient's spiritual, cultural, and educational needs were considered, and the patient is agreeable to the plan of care and goals.           Plan: consider gait training, sit to stands from hi-low, and tke next session    Goals:   Active       Ambulation/movement       Patient will perform TUG in < 13 seconds with least restrictive assisive device to reduce risk for falls.  (Ongoing)       Start:  05/23/25    Expected End:  07/07/25            Patient will ascend at least 5 steps for stair navigation  (Ongoing)       Start:  05/23/25    Expected End:  07/07/25            Patient will descend at least 5 steps for stair navigation  (Ongoing)       Start:  05/23/25    Expected End:  07/07/25               Functional outcome       Patient will show a significant change in FOTO patient-reported outcome tool to demonstrate subjective improvement (Ongoing)       Start:  05/23/25    Expected End:  07/07/25             Patient stated goal: get back to normal activities  (Ongoing)       Start:  05/23/25    Expected End:  07/07/25            Patient will demonstrate independence in home program for support of progression (Ongoing)       Start:  05/23/25    Expected End:  07/07/25               Pain       Patient will report pain of < 2/10 demonstrating a reduction of overall pain (Ongoing)       Start:  05/23/25    Expected End:  07/07/25            Patient will report a 2 point reduction in pain while performing prolonged walking (Ongoing)       Start:  05/23/25    Expected End:  07/07/25               Range of Motion       Patient will achieve right knee ROM of  0-110 degrees  (Ongoing)       Start:  05/23/25    Expected End:  07/07/25               Strength       Patient will achieve right knee extension strength of 4/5 (Ongoing)       Start:  05/23/25    Expected End:  07/07/25                Marlin Ramirez, PT

## 2025-06-02 ENCOUNTER — CLINICAL SUPPORT (OUTPATIENT)
Dept: REHABILITATION | Facility: HOSPITAL | Age: 75
End: 2025-06-02
Payer: MEDICARE

## 2025-06-02 DIAGNOSIS — M25.661 DECREASED RANGE OF MOTION (ROM) OF RIGHT KNEE: Primary | ICD-10-CM

## 2025-06-02 PROCEDURE — 97112 NEUROMUSCULAR REEDUCATION: CPT | Mod: CQ

## 2025-06-02 PROCEDURE — 97530 THERAPEUTIC ACTIVITIES: CPT | Mod: CQ

## 2025-06-04 ENCOUNTER — CLINICAL SUPPORT (OUTPATIENT)
Dept: REHABILITATION | Facility: HOSPITAL | Age: 75
End: 2025-06-04
Payer: MEDICARE

## 2025-06-04 DIAGNOSIS — M25.661 DECREASED RANGE OF MOTION (ROM) OF RIGHT KNEE: Primary | ICD-10-CM

## 2025-06-04 PROCEDURE — 97112 NEUROMUSCULAR REEDUCATION: CPT

## 2025-06-04 PROCEDURE — 97140 MANUAL THERAPY 1/> REGIONS: CPT

## 2025-06-04 PROCEDURE — 97014 ELECTRIC STIMULATION THERAPY: CPT

## 2025-06-06 ENCOUNTER — CLINICAL SUPPORT (OUTPATIENT)
Dept: REHABILITATION | Facility: HOSPITAL | Age: 75
End: 2025-06-06
Payer: MEDICARE

## 2025-06-06 DIAGNOSIS — M25.661 DECREASED RANGE OF MOTION (ROM) OF RIGHT KNEE: Primary | ICD-10-CM

## 2025-06-06 PROCEDURE — 97530 THERAPEUTIC ACTIVITIES: CPT

## 2025-06-06 PROCEDURE — 97014 ELECTRIC STIMULATION THERAPY: CPT

## 2025-06-06 PROCEDURE — 97140 MANUAL THERAPY 1/> REGIONS: CPT

## 2025-06-08 RX ORDER — ASPIRIN 81 MG/1
81 TABLET ORAL 2 TIMES DAILY
Qty: 84 TABLET | Refills: 0 | Status: CANCELLED | OUTPATIENT
Start: 2025-06-08 | End: 2025-07-20

## 2025-06-08 RX ORDER — FAMOTIDINE 20 MG/1
20 TABLET, FILM COATED ORAL 2 TIMES DAILY
Qty: 84 TABLET | Refills: 0 | Status: CANCELLED | OUTPATIENT
Start: 2025-06-08 | End: 2025-07-20

## 2025-06-09 ENCOUNTER — CLINICAL SUPPORT (OUTPATIENT)
Dept: REHABILITATION | Facility: HOSPITAL | Age: 75
End: 2025-06-09
Payer: MEDICARE

## 2025-06-09 DIAGNOSIS — M25.661 DECREASED RANGE OF MOTION (ROM) OF RIGHT KNEE: Primary | ICD-10-CM

## 2025-06-09 PROCEDURE — 97530 THERAPEUTIC ACTIVITIES: CPT

## 2025-06-09 RX ORDER — FAMOTIDINE 20 MG/1
20 TABLET, FILM COATED ORAL 2 TIMES DAILY
Qty: 84 TABLET | Refills: 0 | Status: CANCELLED | OUTPATIENT
Start: 2025-06-08 | End: 2025-07-20

## 2025-06-09 RX ORDER — ASPIRIN 81 MG/1
81 TABLET ORAL 2 TIMES DAILY
Qty: 84 TABLET | Refills: 0 | Status: CANCELLED | OUTPATIENT
Start: 2025-06-08 | End: 2025-07-20

## 2025-06-11 ENCOUNTER — CLINICAL SUPPORT (OUTPATIENT)
Dept: REHABILITATION | Facility: HOSPITAL | Age: 75
End: 2025-06-11
Payer: MEDICARE

## 2025-06-11 DIAGNOSIS — M25.661 DECREASED RANGE OF MOTION (ROM) OF RIGHT KNEE: Primary | ICD-10-CM

## 2025-06-11 PROCEDURE — 97530 THERAPEUTIC ACTIVITIES: CPT | Mod: CQ

## 2025-06-11 NOTE — PROGRESS NOTES
"  Outpatient Rehab    Physical Therapy Visit    Patient Name: Luli Triplett  MRN: [id number]  YOB: 1950  Encounter Date: 6/11/2025    Therapy Diagnosis:   Encounter Diagnosis   Name Primary?    Decreased range of motion (ROM) of right knee Yes     Physician: Amos Newman MD    Physician Orders: Eval and Treat  Medical Diagnosis: Pain in right knee  Unilateral primary osteoarthritis, right knee  Muscle weakness (generalized)  Surgical Diagnosis: Not applicable for this Episode   Surgical Date: Not applicable for this Episode    Visit # / Visits Authorized:  8 / 20  Insurance Authorization Period: 4/29/2025 to 12/31/2025  Date of Evaluation: 5/23/2025  Plan of Care Certification: 5/26/2025 to 7/7/2025      PT/PTA: PT   Number of PTA visits since last PT visit:0  Time In: 1030   Time Out: 1100  Total Time (in minutes): 30   Total Billable Time (in minutes): 30    FOTO:  Intake Score:  %  Survey Score 2:  %  Survey Score 3:  %    Precautions:       Subjective   that she has been attempting to use her RLE more at home..  Pain reported as 2/10.      Objective            Treatment:  Therapeutic Activity  TA 2: Sit to stands from hi-lo mat: 1 x 10 reps with significant cueing for weight shifting, nose over toes, posterior weight shifting with sitting  TA 3: step ups, 4" step: 2x10/RLE  TA 4: weight shifting with verbal cues for proper alignment, TKE in midstance, knee flexion in non weighbearing LE, upright posture  TA 5: ambulating in // bars with verbal cues for weight acceptance in midstance and to increase push off in terminal contact    Time Entry(in minutes):  Therapeutic Activity Time Entry: 30    Assessment & Plan   Assessment: Patient demonstrates improved R quadriceps control/activation when compared to the previous treatment visit. Pt performed sit to stands with improved technique and ambulated with improved weight acceptance.       The patient will continue to benefit from skilled outpatient " physical therapy in order to address the deficits listed in the problem list on the initial evaluation, provide patient and family education, and maximize the patients level of independence in the home and community environments.     The patient's spiritual, cultural, and educational needs were considered, and the patient is agreeable to the plan of care and goals.           Plan: Will continue per POC towards treatment goals. PT/PTA met face to face to discuss patient's treatment plan and progress towards established goals. Patient will be seen by physical therapist every sixth visit and minimally once per month.    Goals:   Active       Ambulation/movement       Patient will perform TUG in < 13 seconds with least restrictive assisive device to reduce risk for falls.  (Ongoing)       Start:  05/23/25    Expected End:  07/07/25            Patient will ascend at least 5 steps for stair navigation  (Ongoing)       Start:  05/23/25    Expected End:  07/07/25            Patient will descend at least 5 steps for stair navigation  (Ongoing)       Start:  05/23/25    Expected End:  07/07/25               Functional outcome       Patient will show a significant change in FOTO patient-reported outcome tool to demonstrate subjective improvement (Ongoing)       Start:  05/23/25    Expected End:  07/07/25            Patient stated goal: get back to normal activities  (Ongoing)       Start:  05/23/25    Expected End:  07/07/25            Patient will demonstrate independence in home program for support of progression (Ongoing)       Start:  05/23/25    Expected End:  07/07/25               Pain       Patient will report pain of < 2/10 demonstrating a reduction of overall pain (Ongoing)       Start:  05/23/25    Expected End:  07/07/25            Patient will report a 2 point reduction in pain while performing prolonged walking (Ongoing)       Start:  05/23/25    Expected End:  07/07/25               Range of Motion        Patient will achieve right knee ROM of  0-110 degrees  (Ongoing)       Start:  05/23/25    Expected End:  07/07/25               Strength       Patient will achieve right knee extension strength of 4/5 (Ongoing)       Start:  05/23/25    Expected End:  07/07/25                Bernard Watts, PTA

## 2025-06-11 NOTE — PROGRESS NOTES
Outpatient Rehab    Physical Therapy Visit    Patient Name: Luli Triplett  MRN: [id number]  YOB: 1950  Encounter Date: 6/4/2025    Therapy Diagnosis:   Encounter Diagnosis   Name Primary?    Decreased range of motion (ROM) of right knee Yes     Physician: Amos Newman MD    Physician Orders: Eval and Treat  Medical Diagnosis: Pain in right knee  Unilateral primary osteoarthritis, right knee  Muscle weakness (generalized)  Surgical Diagnosis: Not applicable for this Episode   Surgical Date: Not applicable for this Episode    Visit # / Visits Authorized:  5 / 20  Insurance Authorization Period: 4/29/2025 to 12/31/2025  Date of Evaluation: 5/23/2025  Plan of Care Certification: 5/26/2025 to 7/7/2025      PT/PTA: PT   Number of PTA visits since last PT visit:0  Time In: 1330   Time Out: 1430  Total Time (in minutes): 60   Total Billable Time (in minutes): 23    FOTO:  Intake Score:  %  Survey Score 2:  %  Survey Score 3:  %    Precautions:       Subjective   no new complaints. Patient reports that she is not very consistent with her exercises..  Pain reported as 2/10.      Objective            Treatment:  Manual Therapy  MT 1: Grade I-III EOM TF joint mobs for flexion  Balance/Neuromuscular Re-Education  NMR 1: Patient education: HEP compliance, swelling management, ROM expectations post TKA, importance of extension ROM for proper gait cycle  NMR 2: Russian NMES 10 sec on/20 sec off including: quad sets with towel roll x 8 minutes, SAQs with medium bolster x 12 minutes, SAQs with 1/2 foam x 10 minutes (held)  NMR 3: heel prop 3'  NMR 4: Heel slides to improve tissue extensibility and ROM: 10 second hold, 5' x 2 rounds (pre and post session)  NMR 5: Ambulation with fww in clinic with emphasis on proper gait cycle  NMR 6: standing tke with ball: 10 second hold, 20 reps  Therapeutic Activity  TA 1: NuStep for 10 minutes, Lvl 1.5 for gait mechanics  TA 2: Sit to stands from hi-lo mat: 2 x 10 reps with  significant cueing for weight shifting, nose over toes, posterior weight shifting with sitting    Time Entry(in minutes):  E-Stim (Unattended) Time Entry: 20  Manual Therapy Time Entry: 8  Neuromuscular Re-Education Time Entry: 34  Therapeutic Activity Time Entry: 18    Assessment & Plan   Assessment: Patient demonstrates fair to good quadriceps control through isolated positions and able to sustain 10 sec hold without assistance. Patients greatest limiting factor is knee flexion rom which was noted at IE and patient not showing any progressions between sessions. Progressed with importance regarding functional movement patterns and patient requires maximal cueing for sit to stands and standing tke dur to poor ability to weight shift onto le. knee rom measures 0 degrees - 85 degrees AA + manual overpressure.       The patient will continue to benefit from skilled outpatient physical therapy in order to address the deficits listed in the problem list on the initial evaluation, provide patient and family education, and maximize the patients level of independence in the home and community environments.     The patient's spiritual, cultural, and educational needs were considered, and the patient is agreeable to the plan of care and goals.           Plan:      Goals:   Active       Ambulation/movement       Patient will perform TUG in < 13 seconds with least restrictive assisive device to reduce risk for falls.  (Ongoing)       Start:  05/23/25    Expected End:  07/07/25            Patient will ascend at least 5 steps for stair navigation  (Ongoing)       Start:  05/23/25    Expected End:  07/07/25            Patient will descend at least 5 steps for stair navigation  (Ongoing)       Start:  05/23/25    Expected End:  07/07/25               Functional outcome       Patient will show a significant change in FOTO patient-reported outcome tool to demonstrate subjective improvement (Ongoing)       Start:  05/23/25     Expected End:  07/07/25            Patient stated goal: get back to normal activities  (Ongoing)       Start:  05/23/25    Expected End:  07/07/25            Patient will demonstrate independence in home program for support of progression (Ongoing)       Start:  05/23/25    Expected End:  07/07/25               Pain       Patient will report pain of < 2/10 demonstrating a reduction of overall pain (Ongoing)       Start:  05/23/25    Expected End:  07/07/25            Patient will report a 2 point reduction in pain while performing prolonged walking (Ongoing)       Start:  05/23/25    Expected End:  07/07/25               Range of Motion       Patient will achieve right knee ROM of  0-110 degrees  (Ongoing)       Start:  05/23/25    Expected End:  07/07/25               Strength       Patient will achieve right knee extension strength of 4/5 (Ongoing)       Start:  05/23/25    Expected End:  07/07/25                Marlin Ramirez, PT

## 2025-06-12 NOTE — PROGRESS NOTES
Outpatient Rehab    Physical Therapy Visit    Patient Name: Luli Triplett  MRN: [id number]  YOB: 1950  Encounter Date: 6/6/2025    Therapy Diagnosis:   Encounter Diagnosis   Name Primary?    Decreased range of motion (ROM) of right knee Yes     Physician: Amos Newman MD    Physician Orders: Eval and Treat  Medical Diagnosis: Pain in right knee  Unilateral primary osteoarthritis, right knee  Muscle weakness (generalized)  Surgical Diagnosis: Not applicable for this Episode   Surgical Date: Not applicable for this Episode    Visit # / Visits Authorized:  6 / 20  Insurance Authorization Period: 4/29/2025 to 12/31/2025  Date of Evaluation: 5/23/2025  Plan of Care Certification: 5/26/2025 to 7/7/2025      PT/PTA: PT   Number of PTA visits since last PT visit:0  Time In: 1000   Time Out: 1053  Total Time (in minutes): 53   Total Billable Time (in minutes): 23    FOTO:  Intake Score:  %  Survey Score 2:  %  Survey Score 3:  %    Precautions:       Subjective   patient reports that she does not perform her exercises that often at home and is trying to get around better.  Pain reported as 2/10.      Objective            Treatment:  Manual Therapy  MT 1: Grade I-III EOM TF joint mobs for flexion  Balance/Neuromuscular Re-Education  NMR 1: Patient education: HEP compliance, swelling management, ROM expectations post TKA, importance of extension ROM for proper gait cycle  NMR 2: Russian NMES 10 sec on/20 sec off including: quad sets with towel roll x 8 minutes, SAQs with medium bolster x 12 minutes, SAQs with 1/2 foam x 10 minutes (held)  NMR 3: heel prop 3'  NMR 4: Heel slides to improve tissue extensibility and ROM: 10 second hold, 5' x 2 rounds (pre and post session)  NMR 5: Ambulation with fww in clinic with emphasis on proper gait cycle  NMR 6: standing tke with ball: 10 second hold, 20 reps  Therapeutic Activity  TA 1: NuStep for 10 minutes, Lvl 1.5 for gait mechanics  TA 2: Sit to stands from hi-lo  "mat: 1 x 10 reps with significant cueing for weight shifting, nose over toes, posterior weight shifting with sitting  TA 3: step ups, 4" step: 2x10/RLE  TA 4: weight shifting with verbal cues for proper alignment, TKE in midstance, knee flexion in non weighbearing LE, upright posture  TA 5: ambulating in // bars with verbal cues for weight acceptance in midstance and to increase push off in terminal contact    Time Entry(in minutes):  E-Stim (Unattended) Time Entry: 20  Manual Therapy Time Entry: 8  Neuromuscular Re-Education Time Entry: 23  Therapeutic Activity Time Entry: 22    Assessment & Plan   Assessment: continue to focus on gross quadriceps control and knee flexion rom but no change noted between sessions. Patient does not perform exercises at home and remains wtih significant hip er which hinders her positioning for futher progress. Will continue to shift focus to functional exercises to improve independence with [email or web address].       The patient will continue to benefit from skilled outpatient physical therapy in order to address the deficits listed in the problem list on the initial evaluation, provide patient and family education, and maximize the patients level of independence in the home and community environments.     The patient's spiritual, cultural, and educational needs were considered, and the patient is agreeable to the plan of care and goals.           Plan:      Goals:   Active       Ambulation/movement       Patient will perform TUG in < 13 seconds with least restrictive assisive device to reduce risk for falls.  (Ongoing)       Start:  05/23/25    Expected End:  07/07/25            Patient will ascend at least 5 steps for stair navigation  (Ongoing)       Start:  05/23/25    Expected End:  07/07/25            Patient will descend at least 5 steps for stair navigation  (Ongoing)       Start:  05/23/25    Expected End:  07/07/25               Functional outcome       Patient will show a " significant change in FOTO patient-reported outcome tool to demonstrate subjective improvement (Ongoing)       Start:  05/23/25    Expected End:  07/07/25            Patient stated goal: get back to normal activities  (Ongoing)       Start:  05/23/25    Expected End:  07/07/25            Patient will demonstrate independence in home program for support of progression (Ongoing)       Start:  05/23/25    Expected End:  07/07/25               Pain       Patient will report pain of < 2/10 demonstrating a reduction of overall pain (Ongoing)       Start:  05/23/25    Expected End:  07/07/25            Patient will report a 2 point reduction in pain while performing prolonged walking (Ongoing)       Start:  05/23/25    Expected End:  07/07/25               Range of Motion       Patient will achieve right knee ROM of  0-110 degrees  (Ongoing)       Start:  05/23/25    Expected End:  07/07/25               Strength       Patient will achieve right knee extension strength of 4/5 (Ongoing)       Start:  05/23/25    Expected End:  07/07/25                Marlin Ramirez, PT

## 2025-06-13 ENCOUNTER — CLINICAL SUPPORT (OUTPATIENT)
Dept: REHABILITATION | Facility: HOSPITAL | Age: 75
End: 2025-06-13
Payer: MEDICARE

## 2025-06-13 DIAGNOSIS — M25.661 DECREASED RANGE OF MOTION (ROM) OF RIGHT KNEE: Primary | ICD-10-CM

## 2025-06-13 PROCEDURE — 97014 ELECTRIC STIMULATION THERAPY: CPT | Mod: CQ

## 2025-06-13 PROCEDURE — 97530 THERAPEUTIC ACTIVITIES: CPT | Mod: CQ

## 2025-06-13 NOTE — PROGRESS NOTES
"  Outpatient Rehab    Physical Therapy Visit    Patient Name: Luli Triplett  MRN: [id number]  YOB: 1950  Encounter Date: 6/13/2025    Therapy Diagnosis:   Encounter Diagnosis   Name Primary?    Decreased range of motion (ROM) of right knee Yes     Physician: Amos Newman MD    Physician Orders: Eval and Treat  Medical Diagnosis: Pain in right knee  Unilateral primary osteoarthritis, right knee  Muscle weakness (generalized)  Surgical Diagnosis: Not applicable for this Episode   Surgical Date: Not applicable for this Episode    Visit # / Visits Authorized:  9 / 20  Insurance Authorization Period: 4/29/2025 to 12/31/2025  Date of Evaluation: 5/23/2025  Plan of Care Certification: 5/26/2025 to 7/7/2025      PT/PTA: PTA   Number of PTA visits since last PT visit:1  Time In: 1000   Time Out: 1100  Total Time (in minutes): 60   Total Billable Time (in minutes): 23    FOTO:  Intake Score: 59%  Survey Score 2: 70%  Survey Score 3:  %    Precautions: none    Subjective   Patient reports she has been practicing "heel to toe" and walking with her walker.  Pain reported as 5/10. Right knee    Objective  Objective Measures updated at progress report unless specified.     Treatment:  Balance/Neuromuscular Re-Education  NMR 1: Patient education: HEP compliance, swelling management, ROM expectations post TKA, importance of extension ROM for proper gait cycle  NMR 2: Russian NMES 10 sec on/20 sec off including: SAQs with medium bolster x 00 minutes, SAQs with 1/2 foam x 00 minutes, LAQs at EOM x 10 minutes  NMR 5: ambulation with fww in clinic with emphasis on proper gait cycle  NMR 6: standing tke with ball: 10 second hold, 20 reps  Therapeutic Activity  TA 1: NuStep for 10 minutes, Lvl 1.5 for gait mechanics  TA 2: Sit to stands from hi-lo mat: 2 x 10 reps with significant cueing for weight shifting, nose over toes, posterior weight shifting with sitting  TA 3: step ups, 4" step: 2 x 10 reps/RLE  TA 4: weight " shifting with verbal cues for proper alignment, TKE in midstance, knee flexion in non weighbearing LE, upright posture    Time Entry(in minutes):  E-Stim (Unattended) Time Entry: 10  Neuromuscular Re-Education Time Entry: 30  Therapeutic Activity Time Entry: 30    Assessment & Plan   Assessment: Difficulty with sit to stands requiring cueing for foot placement and weight shifting. Good tolerance to therapeutic interventions noting adequate muscle response throughout.  Evaluation/Treatment Tolerance: Patient tolerated treatment well    The patient will continue to benefit from skilled outpatient physical therapy in order to address the deficits listed in the problem list on the initial evaluation, provide patient and family education, and maximize the patients level of independence in the home and community environments.     The patient's spiritual, cultural, and educational needs were considered, and the patient is agreeable to the plan of care and goals.           Plan: Will continue per POC towards treatment goals. PT/PTA met face to face to discuss patient's treatment plan and progress towards established goals. Patient will be seen by physical therapist every sixth visit and minimally once per month.    Goals:   Active       Ambulation/movement       Patient will perform TUG in < 13 seconds with least restrictive assisive device to reduce risk for falls.  (Progressing)       Start:  05/23/25    Expected End:  07/07/25            Patient will ascend at least 5 steps for stair navigation  (Progressing)       Start:  05/23/25    Expected End:  07/07/25            Patient will descend at least 5 steps for stair navigation  (Progressing)       Start:  05/23/25    Expected End:  07/07/25               Functional outcome       Patient will show a significant change in FOTO patient-reported outcome tool to demonstrate subjective improvement (Progressing)       Start:  05/23/25    Expected End:  07/07/25             Patient stated goal: get back to normal activities  (Progressing)       Start:  05/23/25    Expected End:  07/07/25            Patient will demonstrate independence in home program for support of progression (Progressing)       Start:  05/23/25    Expected End:  07/07/25               Pain       Patient will report pain of < 2/10 demonstrating a reduction of overall pain (Progressing)       Start:  05/23/25    Expected End:  07/07/25            Patient will report a 2 point reduction in pain while performing prolonged walking (Progressing)       Start:  05/23/25    Expected End:  07/07/25               Range of Motion       Patient will achieve right knee ROM of  0-110 degrees  (Progressing)       Start:  05/23/25    Expected End:  07/07/25               Strength       Patient will achieve right knee extension strength of 4/5 (Progressing)       Start:  05/23/25    Expected End:  07/07/25                Kassie Rosen, PTA

## 2025-06-16 ENCOUNTER — CLINICAL SUPPORT (OUTPATIENT)
Dept: REHABILITATION | Facility: HOSPITAL | Age: 75
End: 2025-06-16
Payer: MEDICARE

## 2025-06-16 DIAGNOSIS — M25.661 DECREASED RANGE OF MOTION (ROM) OF RIGHT KNEE: Primary | ICD-10-CM

## 2025-06-16 PROCEDURE — 97530 THERAPEUTIC ACTIVITIES: CPT | Mod: CQ

## 2025-06-17 NOTE — PROGRESS NOTES
"  Outpatient Rehab    Physical Therapy Visit    Patient Name: Luli Triplett  MRN: [id number]  YOB: 1950  Encounter Date: 6/16/2025    Therapy Diagnosis:   Encounter Diagnosis   Name Primary?    Decreased range of motion (ROM) of right knee Yes     Physician: Amos Newman MD    Physician Orders: Eval and Treat  Medical Diagnosis: Pain in right knee  Unilateral primary osteoarthritis, right knee  Muscle weakness (generalized)  Surgical Diagnosis: Right TKA   Surgical Date: 5/5/2025  Days Since Last Surgery: 43    Visit # / Visits Authorized:  10 / 20  Insurance Authorization Period: 4/29/2025 to 12/31/2025  Date of Evaluation: 5/23/2025  Plan of Care Certification: 5/26/2025 to 7/7/2025      PT/PTA: PTA   Number of PTA visits since last PT visit:2  Time In: 1238   Time Out: 1338  Total Time (in minutes): 60   Total Billable Time (in minutes): 30    FOTO:  Intake Score: 59%  Survey Score 2: 70%  Survey Score 3:  %    Precautions: Fall Risk    Subjective   Patient reports "I think it's getting better". She presents wearing her compression stocking.  Pain reported as 1/10. Right knee    Objective   Range of Motion:   Knee Right Active   Flexion Post: 90 degrees AAROM     Treatment:  Balance/Neuromuscular Re-Education  NMR 1: Patient education: HEP compliance, swelling management, ROM expectations post TKA, importance of extension ROM for proper gait cycle  NMR 2: Russian NMES 10 sec on/20 sec off including: SAQs with medium bolster x 00 minutes, SAQs with 1/2 foam x 00 minutes, LAQs at EOM x 10 minutes  NMR 4: Heel slides to improve tissue extensibility and ROM: 10 second hold, 5 minutes x 2 rounds (pre and post session)  NMR 5: ambulation with fww in clinic with emphasis on proper gait cycle  NMR 6: standing tke with ball: 10 second hold, 5 minutes  Therapeutic Activity  TA 1: NuStep for 10 minutes, Lvl 1.5 for gait mechanics  TA 2: Sit to stands from hi-lo mat: 2-3 x 10 reps with significant cueing for " "weight shifting, nose over toes, posterior weight shifting with sitting  TA 3: step ups, 4" step: 3 x 10 reps/RLE    Time Entry(in minutes):  Neuromuscular Re-Education Time Entry: 30  Therapeutic Activity Time Entry: 30    Assessment & Plan   Assessment: Luli is 6 weeks s/p Right TKA. She presents ambulating with RW. There are visible gait deviations observed. Continues to have significant difficulty with sit to stands proper weight distribution and nose over toes technique. She measured 90 degrees AAROM post session.  Evaluation/Treatment Tolerance: Patient tolerated treatment well    The patient will continue to benefit from skilled outpatient physical therapy in order to address the deficits listed in the problem list on the initial evaluation, provide patient and family education, and maximize the patients level of independence in the home and community environments.     The patient's spiritual, cultural, and educational needs were considered, and the patient is agreeable to the plan of care and goals.           Plan: Will continue per POC towards treatment goals. PT/PTA met face to face to discuss patient's treatment plan and progress towards established goals. Patient will be seen by physical therapist every sixth visit and minimally once per month.    Goals:   Active       Ambulation/movement       Patient will perform TUG in < 13 seconds with least restrictive assisive device to reduce risk for falls.  (Progressing)       Start:  05/23/25    Expected End:  07/07/25            Patient will ascend at least 5 steps for stair navigation  (Progressing)       Start:  05/23/25    Expected End:  07/07/25            Patient will descend at least 5 steps for stair navigation  (Progressing)       Start:  05/23/25    Expected End:  07/07/25               Functional outcome       Patient will show a significant change in FOTO patient-reported outcome tool to demonstrate subjective improvement (Progressing)       " Start:  05/23/25    Expected End:  07/07/25            Patient stated goal: get back to normal activities  (Progressing)       Start:  05/23/25    Expected End:  07/07/25            Patient will demonstrate independence in home program for support of progression (Progressing)       Start:  05/23/25    Expected End:  07/07/25               Pain       Patient will report pain of < 2/10 demonstrating a reduction of overall pain (Progressing)       Start:  05/23/25    Expected End:  07/07/25            Patient will report a 2 point reduction in pain while performing prolonged walking (Progressing)       Start:  05/23/25    Expected End:  07/07/25               Range of Motion       Patient will achieve right knee ROM of  0-110 degrees  (Progressing)       Start:  05/23/25    Expected End:  07/07/25               Strength       Patient will achieve right knee extension strength of 4/5 (Progressing)       Start:  05/23/25    Expected End:  07/07/25                Kassie Rosen, PTA

## 2025-06-18 ENCOUNTER — CLINICAL SUPPORT (OUTPATIENT)
Dept: REHABILITATION | Facility: HOSPITAL | Age: 75
End: 2025-06-18
Payer: MEDICARE

## 2025-06-18 DIAGNOSIS — M25.661 DECREASED RANGE OF MOTION (ROM) OF RIGHT KNEE: Primary | ICD-10-CM

## 2025-06-18 PROCEDURE — 97530 THERAPEUTIC ACTIVITIES: CPT | Mod: CQ

## 2025-06-18 NOTE — PROGRESS NOTES
"  Outpatient Rehab    Physical Therapy Visit    Patient Name: Luli Triplett  MRN: [id number]  YOB: 1950  Encounter Date: 6/18/2025    Therapy Diagnosis:   Encounter Diagnosis   Name Primary?    Decreased range of motion (ROM) of right knee Yes     Physician: Amos Newman MD    Physician Orders: Eval and Treat  Medical Diagnosis: Pain in right knee  Unilateral primary osteoarthritis, right knee  Muscle weakness (generalized)  Surgical Diagnosis: Right TKA   Surgical Date: 5/5/2025  Days Since Last Surgery: 44    Visit # / Visits Authorized:  11 / 20  Insurance Authorization Period: 4/29/2025 to 12/31/2025  Date of Evaluation: 5/23/2025  Plan of Care Certification: 5/26/2025 to 7/7/2025      PT/PTA: PTA   Number of PTA visits since last PT visit:2  Time In: 1010   Time Out: 1105  Total Time (in minutes): 55   Total Billable Time (in minutes): 27    FOTO:  Intake Score:  %  Survey Score 2:  %  Survey Score 3:  %    Precautions:       Subjective   that she is suprised that she has not been walking correctly for some time.  Pain reported as 1/10.      Objective            Treatment:  Balance/Neuromuscular Re-Education  NMR 6: quad set with heel prop + small ball under knee: x20 with 5 sec holds; standing tke with ball: 10 second hold, 5 minutes  Therapeutic Activity  TA 3: step ups, 4" step: 3 x 5 reps/RLE  TA 4: weight shifting with verbal cues for proper alignment, TKE in midstance, knee flexion in non weighbearing LE, upright posture  TA 5: ambulating in // bars with verbal cues for weight acceptance in midstance and to increase push off in terminal contact    Time Entry(in minutes):  Neuromuscular Re-Education Time Entry: 10  Therapeutic Activity Time Entry: 45    Assessment & Plan   Assessment: Luli is 6 weeks + s/p Right TKA. She presents ambulating with RW. There are visible gait deviations observed. Continues to have significant difficulty accepting weight on RLE. Hence, treatment mainly focused " on weight acceptance during pre-gait activities.        The patient will continue to benefit from skilled outpatient physical therapy in order to address the deficits listed in the problem list on the initial evaluation, provide patient and family education, and maximize the patients level of independence in the home and community environments.     The patient's spiritual, cultural, and educational needs were considered, and the patient is agreeable to the plan of care and goals.           Plan: Will continue per POC towards treatment goals. PT/PTA met face to face to discuss patient's treatment plan and progress towards established goals. Patient will be seen by physical therapist every sixth visit and minimally once per month.    Goals:   Active       Ambulation/movement       Patient will perform TUG in < 13 seconds with least restrictive assisive device to reduce risk for falls.  (Progressing)       Start:  05/23/25    Expected End:  07/07/25            Patient will ascend at least 5 steps for stair navigation  (Progressing)       Start:  05/23/25    Expected End:  07/07/25            Patient will descend at least 5 steps for stair navigation  (Progressing)       Start:  05/23/25    Expected End:  07/07/25               Functional outcome       Patient will show a significant change in FOTO patient-reported outcome tool to demonstrate subjective improvement (Progressing)       Start:  05/23/25    Expected End:  07/07/25            Patient stated goal: get back to normal activities  (Progressing)       Start:  05/23/25    Expected End:  07/07/25            Patient will demonstrate independence in home program for support of progression (Progressing)       Start:  05/23/25    Expected End:  07/07/25               Pain       Patient will report pain of < 2/10 demonstrating a reduction of overall pain (Progressing)       Start:  05/23/25    Expected End:  07/07/25            Patient will report a 2 point reduction  in pain while performing prolonged walking (Progressing)       Start:  05/23/25    Expected End:  07/07/25               Range of Motion       Patient will achieve right knee ROM of  0-110 degrees  (Progressing)       Start:  05/23/25    Expected End:  07/07/25               Strength       Patient will achieve right knee extension strength of 4/5 (Progressing)       Start:  05/23/25    Expected End:  07/07/25                Bernard Watts, PTA

## 2025-06-19 ENCOUNTER — OFFICE VISIT (OUTPATIENT)
Dept: ORTHOPEDICS | Facility: CLINIC | Age: 75
End: 2025-06-19
Payer: MEDICARE

## 2025-06-19 VITALS — HEIGHT: 62 IN | BODY MASS INDEX: 37.21 KG/M2 | WEIGHT: 202.19 LBS

## 2025-06-19 DIAGNOSIS — Z47.1 AFTERCARE FOLLOWING RIGHT KNEE JOINT REPLACEMENT SURGERY: Primary | ICD-10-CM

## 2025-06-19 DIAGNOSIS — Z96.651 AFTERCARE FOLLOWING RIGHT KNEE JOINT REPLACEMENT SURGERY: Primary | ICD-10-CM

## 2025-06-19 PROCEDURE — 99999 PR PBB SHADOW E&M-EST. PATIENT-LVL III: CPT | Mod: PBBFAC,,, | Performed by: PHYSICIAN ASSISTANT

## 2025-06-19 PROCEDURE — 99024 POSTOP FOLLOW-UP VISIT: CPT | Mod: POP,,, | Performed by: PHYSICIAN ASSISTANT

## 2025-06-19 PROCEDURE — 99213 OFFICE O/P EST LOW 20 MIN: CPT | Mod: PBBFAC,PN | Performed by: PHYSICIAN ASSISTANT

## 2025-06-19 NOTE — PROGRESS NOTES
Subjective:      Chief Complaint: Pain and Post-op Evaluation of the Right Knee (ROM check R TKA 5/5/25 1 m f/u)    Patient ID: Luli Triplett is a 75 y.o. female.  Patient is 6 weeks s/p  right primary total knee replacement  Anterior knee pain: No  Has improved pain  Is in physical therapy  Yes  Problems w incision  No  Is  happy with result  Yes  Opiod free: Yes   Here for ROM check. Notes significant swelling the leg.  Has history of lymphedema bilaterally.  She is wearing a thigh high compression hose today.  Still using walker but uses cane also.  States that therapy is trying to correct her gait.  She ambulates with her leg externally rotated.  She denies hip pain though.  She is happy with her results though and can still complete ADLs without difficulty.        Past Medical History:   Diagnosis Date    High cholesterol     Hypertension     Macular degeneration         Past Surgical History:   Procedure Laterality Date    ARTHROPLASTY, KNEE, TOTAL, SIGHT ASSISTED Right 5/5/2025    Procedure: ARTHROPLASTY, KNEE, SIGHT ASSISTED;  Surgeon: Amos Newman MD;  Location: [facility];  Service: Orthopedics;  Laterality: Right;  bmi - 37.06    HYSTERECTOMY          Current Outpatient Medications   Medication Instructions    aspirin (ECOTRIN) 81 mg, Oral, 2 times daily    atorvastatin (LIPITOR) 40 mg, Daily    calcium carbonate (OS-PARAS) 600 mg, 2 times daily with meals    cetirizine (ZYRTEC) 10 mg, Daily    famotidine (PEPCID) 20 mg, Oral, 2 times daily    hydroCHLOROthiazide 12.5 mg, Daily    lisinopriL 10 mg, Daily    meclizine (ANTIVERT) 25 mg, Oral, 3 times daily PRN    metFORMIN (GLUCOPHAGE) 500 mg, With breakfast    MULTIVIT-MIN/FA/CALCIUM/VIT K1 (ONE-A-DAY WOMEN'S 50+ ORAL) Take by mouth.    suzetrigine 50 mg Tab Take 2 tablets by mouth as soon as you get home. The next morning start taking 1 tablet every 12 hours.    triamterene-hydrochlorothiazide 37.5-25 mg (DYAZIDE) 37.5-25 mg per capsule 1 capsule, Every  "morning        Review of patient's allergies indicates:   Allergen Reactions    Asa [aspirin]     Pcn [penicillins]     Codeine Nausea Only     Reaction to codeine in codeine in cough medicine. Nausea only.  Patient can tolerate Percocet.       Review of Systems   Constitutional: Negative for fever and malaise/fatigue.   Eyes:  Negative for blurred vision.   Cardiovascular:  Negative for chest pain.   Respiratory:  Negative for shortness of breath.    Skin:  Negative for poor wound healing.   Musculoskeletal:  Positive for joint pain, joint swelling and myalgias.   Genitourinary:  Negative for bladder incontinence.   Neurological:  Negative for dizziness, numbness and paresthesias.   Psychiatric/Behavioral:  Negative for altered mental status.        The patient's relevant past medical, surgical, and social history was reviewed in Epic.       Objective:      VITAL SIGNS: Ht 5' 2" (1.575 m)   Wt 91.7 kg (202 lb 2.6 oz)   BMI 36.98 kg/m²     General    Nursing note and vitals reviewed.  Constitutional: She is oriented to person, place, and time. She appears well-developed and well-nourished.   Neurological: She is alert and oriented to person, place, and time.           Right Knee Exam     Inspection   Scars: present  Swelling: present    Tenderness   The patient is experiencing no tenderness.     Range of Motion   Extension:  5   Flexion:  80     Tests   Ligament Examination   MCL - Valgus: normal (0 to 2mm)  LCL - Varus: normal  Patella   Passive Patellar Tilt: neutral    Other   Sensation: normal    Comments:  Ambulates with hip externally rotated     Muscle Strength   Right Lower Extremity   Quadriceps:  5/5   Hamstrin/5     Vascular Exam     Right Pulses  Dorsalis Pedis:      2+  Posterior Tibial:      2+           Imaging          Assessment:       Luli Triplett is a 75 y.o. female seen in the office today for   1. Aftercare following right knee joint replacement surgery     Patient is doing well.  " She has no pain.  She has little concern of her lack of progress with flexion.  She is not interested in manipulation under anesthesia at this time.  Regarding the gait abnormality, she has significant hip arthritis which I believe is causing this.  Discuss total hip arthroplasty would be the only way to permanently correct this.  She is going to continue therapy as directed at this time.  She will follow-up at 3 months postop with x-rays.      Plan:       Luli was seen today for pain and post-op evaluation.    Diagnoses and all orders for this visit:    Aftercare following right knee joint replacement surgery          Diagnoses and plan discussed with the patient, as well as the expected course and duration of his symptoms.  All questions and concerns were addressed prior to the end of the visit.   Instructed patient to call office if they have any future questions/concerns or to schedule apt. Patient will return to see me if symptoms worsen or fail to improve    Note dictated with voice recognition software, please excuse any grammatical errors.        Mihaela Dasilva PA-C      Department of Orthopedic Surgery  [facility]  Office: [phone]  06/19/2025

## 2025-06-20 ENCOUNTER — CLINICAL SUPPORT (OUTPATIENT)
Dept: REHABILITATION | Facility: HOSPITAL | Age: 75
End: 2025-06-20
Payer: MEDICARE

## 2025-06-20 DIAGNOSIS — M25.661 DECREASED RANGE OF MOTION (ROM) OF RIGHT KNEE: Primary | ICD-10-CM

## 2025-06-20 PROCEDURE — 97112 NEUROMUSCULAR REEDUCATION: CPT | Mod: CQ

## 2025-06-20 PROCEDURE — 97530 THERAPEUTIC ACTIVITIES: CPT | Mod: CQ

## 2025-06-20 NOTE — PROGRESS NOTES
"  Outpatient Rehab    Physical Therapy Visit    Patient Name: Luli Triplett  MRN: [id number]  YOB: 1950  Encounter Date: 6/20/2025    Therapy Diagnosis:   Encounter Diagnosis   Name Primary?    Decreased range of motion (ROM) of right knee Yes     Physician: Amos Newman MD    Physician Orders: Eval and Treat  Medical Diagnosis: Pain in right knee  Unilateral primary osteoarthritis, right knee  Muscle weakness (generalized)  Surgical Diagnosis: Right TKA   Surgical Date: 5/5/2025  Days Since Last Surgery: 46    Visit # / Visits Authorized:  12 / 20  Insurance Authorization Period: 4/29/2025 to 12/31/2025  Date of Evaluation: 5/23/2025  Plan of Care Certification: 5/26/2025 to 7/7/2025      PT/PTA: PTA   Number of PTA visits since last PT visit:5  Time In: 1110   Time Out: 1210  Total Time (in minutes): 60   Total Billable Time (in minutes): 60    FOTO:  Intake Score:  %  Survey Score 2:  %  Survey Score 3:  %    Precautions:       Subjective   that she saw her MD yesterday and she was suprised that she was still using her walker.  Pain reported as 1/10.      Objective            Treatment:  Balance/Neuromuscular Re-Education  NMR 3: heel prop 3'  NMR 4: Heel slides to improve tissue extensibility and ROM: x12 with 5 sec hold  NMR 6: quad set with heel prop + small ball under knee: x20 with 5 sec holds; standing tke with ball: 10 second hold, 5 minutes  Therapeutic Activity  TA 1: NuStep for 10 minutes, Lvl 1.5 for gait mechanics  TA 2: Sit to stands from hi-lo mat: 3 x 10 reps with significant cueing for weight shifting, nose over toes, posterior weight shifting with sitting  TA 3: step ups, 4" step: 3 x 10 reps/RLE  TA 5: ambulating without an AD with verbal cues to increase push off in terminal contact    Time Entry(in minutes):  Neuromuscular Re-Education Time Entry: 10  Therapeutic Activity Time Entry: 50    Assessment & Plan   Assessment: Pt presents ambulating with RW. It was noted, however, " that patient ambulated without an AD during today's treatment session and demonstrated improved weight acceptance and decreased lateral instability. It was also noted that patient appears to have a leg length discrepancy and could benefit from a heel lift in order to minimize the discrepancy. Will progress as tolerated.        The patient will continue to benefit from skilled outpatient physical therapy in order to address the deficits listed in the problem list on the initial evaluation, provide patient and family education, and maximize the patients level of independence in the home and community environments.     The patient's spiritual, cultural, and educational needs were considered, and the patient is agreeable to the plan of care and goals.           Plan: Will continue per POC towards treatment goals. PT/PTA met face to face to discuss patient's treatment plan and progress towards established goals. Patient will be seen by physical therapist every sixth visit and minimally once per month.    Goals:   Active       Ambulation/movement       Patient will perform TUG in < 13 seconds with least restrictive assisive device to reduce risk for falls.  (Progressing)       Start:  05/23/25    Expected End:  07/07/25            Patient will ascend at least 5 steps for stair navigation  (Progressing)       Start:  05/23/25    Expected End:  07/07/25            Patient will descend at least 5 steps for stair navigation  (Progressing)       Start:  05/23/25    Expected End:  07/07/25               Functional outcome       Patient will show a significant change in FOTO patient-reported outcome tool to demonstrate subjective improvement (Progressing)       Start:  05/23/25    Expected End:  07/07/25            Patient stated goal: get back to normal activities  (Progressing)       Start:  05/23/25    Expected End:  07/07/25            Patient will demonstrate independence in home program for support of progression  (Progressing)       Start:  05/23/25    Expected End:  07/07/25               Pain       Patient will report pain of < 2/10 demonstrating a reduction of overall pain (Progressing)       Start:  05/23/25    Expected End:  07/07/25            Patient will report a 2 point reduction in pain while performing prolonged walking (Progressing)       Start:  05/23/25    Expected End:  07/07/25               Range of Motion       Patient will achieve right knee ROM of  0-110 degrees  (Progressing)       Start:  05/23/25    Expected End:  07/07/25               Strength       Patient will achieve right knee extension strength of 4/5 (Progressing)       Start:  05/23/25    Expected End:  07/07/25                Bernard Watts, PTA

## 2025-06-23 ENCOUNTER — CLINICAL SUPPORT (OUTPATIENT)
Dept: REHABILITATION | Facility: HOSPITAL | Age: 75
End: 2025-06-23
Payer: MEDICARE

## 2025-06-23 DIAGNOSIS — M25.661 DECREASED RANGE OF MOTION (ROM) OF RIGHT KNEE: Primary | ICD-10-CM

## 2025-06-23 PROCEDURE — 97530 THERAPEUTIC ACTIVITIES: CPT | Mod: CQ

## 2025-06-23 NOTE — PROGRESS NOTES
"  Outpatient Rehab    Physical Therapy Visit    Patient Name: Luli Triplett  MRN: [id number]  YOB: 1950  Encounter Date: 6/23/2025    Therapy Diagnosis:   Encounter Diagnosis   Name Primary?    Decreased range of motion (ROM) of right knee Yes     Physician: Amos Newman MD    Physician Orders: Eval and Treat  Medical Diagnosis: Pain in right knee  Unilateral primary osteoarthritis, right knee  Muscle weakness (generalized)  Surgical Diagnosis: Right TKA   Surgical Date: 5/5/2025  Days Since Last Surgery: 49    Visit # / Visits Authorized:  13 / 20  Insurance Authorization Period: 4/29/2025 to 12/31/2025  Date of Evaluation: 5/23/2025  Plan of Care Certification: 5/26/2025 to 7/7/2025      PT/PTA:     Number of PTA visits since last PT visit: (6 visits)  Time In: 1240   Time Out: 1335  Total Time (in minutes): 55   Total Billable Time (in minutes): 27    FOTO:  Intake Score:  %  Survey Score 2:  %  Survey Score 3:  %    Precautions:       Subjective   that she has been walking without a walker more and more in her house.  Pain reported as 1/10.      Objective            Treatment:  Balance/Neuromuscular Re-Education  NMR 4: Heel slides to improve tissue extensibility and ROM: x15 with 5 sec hold  NMR 5: -  NMR 6: quad set with heel prop + small ball under knee: x20 with 5 sec holds; standing tke with ball: 10 second hold, 5 minutes  Therapeutic Activity  TA 2: Sit to stands from hi-lo mat: 3 x 10 reps with significant cueing for weight shifting, nose over toes, posterior weight shifting with sitting  TA 3: step ups, 4" step: 3 x 10 reps/RLE  TA 4: weight shifting with verbal cues for proper alignment, TKE in midstance, knee flexion in non weighbearing LE, upright posture  TA 5: ambulating without an AD with verbal cues to increase push off in terminal contact    Time Entry(in minutes):  Neuromuscular Re-Education Time Entry: 20  Therapeutic Activity Time Entry: 40    Assessment & Plan   Assessment: " Pt presents ambulating with RW. Patient ambulated without an AD during today's treatment session and demonstrated improved weight acceptance.It was also noted that patient appears to have a leg length discrepancy and could benefit from a heel lift in order to minimize the lateral instability. Will progress as tolerated.        The patient will continue to benefit from skilled outpatient physical therapy in order to address the deficits listed in the problem list on the initial evaluation, provide patient and family education, and maximize the patients level of independence in the home and community environments.     The patient's spiritual, cultural, and educational needs were considered, and the patient is agreeable to the plan of care and goals.           Plan: Will continue per POC towards treatment goals. PT/PTA met face to face to discuss patient's treatment plan and progress towards established goals. Patient will be seen by physical therapist every sixth visit and minimally once per month.    Goals:   Active       Ambulation/movement       Patient will perform TUG in < 13 seconds with least restrictive assisive device to reduce risk for falls.  (Progressing)       Start:  05/23/25    Expected End:  07/07/25            Patient will ascend at least 5 steps for stair navigation  (Progressing)       Start:  05/23/25    Expected End:  07/07/25            Patient will descend at least 5 steps for stair navigation  (Progressing)       Start:  05/23/25    Expected End:  07/07/25               Functional outcome       Patient will show a significant change in FOTO patient-reported outcome tool to demonstrate subjective improvement (Progressing)       Start:  05/23/25    Expected End:  07/07/25            Patient stated goal: get back to normal activities  (Progressing)       Start:  05/23/25    Expected End:  07/07/25            Patient will demonstrate independence in home program for support of progression  (Progressing)       Start:  05/23/25    Expected End:  07/07/25               Pain       Patient will report pain of < 2/10 demonstrating a reduction of overall pain (Progressing)       Start:  05/23/25    Expected End:  07/07/25            Patient will report a 2 point reduction in pain while performing prolonged walking (Progressing)       Start:  05/23/25    Expected End:  07/07/25               Range of Motion       Patient will achieve right knee ROM of  0-110 degrees  (Progressing)       Start:  05/23/25    Expected End:  07/07/25               Strength       Patient will achieve right knee extension strength of 4/5 (Progressing)       Start:  05/23/25    Expected End:  07/07/25                Bernard Watts, PTA

## 2025-06-24 RX ORDER — ASPIRIN 81 MG/1
81 TABLET ORAL 2 TIMES DAILY
Qty: 84 TABLET | Refills: 0 | Status: CANCELLED | OUTPATIENT
Start: 2025-06-08 | End: 2025-07-20

## 2025-06-24 RX ORDER — FAMOTIDINE 20 MG/1
20 TABLET, FILM COATED ORAL 2 TIMES DAILY
Qty: 84 TABLET | Refills: 0 | Status: CANCELLED | OUTPATIENT
Start: 2025-06-08 | End: 2025-07-20

## 2025-06-24 NOTE — PROGRESS NOTES
"  Outpatient Rehab    Physical Therapy Visit    Patient Name: Luli Triplett  MRN: [id number]  YOB: 1950  Encounter Date: 6/9/2025    Therapy Diagnosis:   Encounter Diagnosis   Name Primary?    Decreased range of motion (ROM) of right knee Yes     Physician: Amos Newman MD    Physician Orders: Eval and Treat  Medical Diagnosis: Pain in right knee  Unilateral primary osteoarthritis, right knee  Muscle weakness (generalized)  Surgical Diagnosis: Right TKA   Surgical Date: 5/5/2025  Days Since Last Surgery: 50    Visit # / Visits Authorized:  7 / 20  Insurance Authorization Period: 4/29/2025 to 12/31/2025  Date of Evaluation: 5/23/2025  Plan of Care Certification: 5/26/2025 to 7/7/2025      PT/PTA:     Number of PTA visits since last PT visit:   Time In: 1200   Time Out: 1300  Total Time (in minutes): 60   Total Billable Time (in minutes): 27    FOTO:  Intake Score:  %  Survey Score 2:  %  Survey Score 3:  %    Precautions:       Subjective   no new complaints. is okay with how her knee is doing.  Pain reported as 2/10.      Objective            Treatment:  Balance/Neuromuscular Re-Education  NMR 1: Patient education: HEP compliance, swelling management, ROM expectations post TKA, importance of extension ROM for proper gait cycle  NMR 2: Russian NMES 10 sec on/20 sec off including: SAQs with medium bolster x 00 minutes, SAQs with 1/2 foam x 00 minutes, LAQs at EOM x 10 minutes  NMR 3: heel prop 3'  NMR 4: Heel slides to improve tissue extensibility and ROM: x15 with 5 sec hold  Therapeutic Activity  TA 1: NuStep for 10 minutes, Lvl 1.5 for gait mechanics  TA 2: Sit to stands from hi-lo mat: 3 x 10 reps with significant cueing for weight shifting, nose over toes, posterior weight shifting with sitting  TA 3: step ups, 4" step: 3 x 10 reps/RLE  TA 4: weight shifting with verbal cues for proper alignment, TKE in midstance, knee flexion in non weighbearing LE, upright posture  TA 5: ambulating without an AD " with verbal cues to increase push off in terminal contact    Time Entry(in minutes):  Neuromuscular Re-Education Time Entry: 20  Therapeutic Activity Time Entry: 40    Assessment & Plan   Assessment: Interventions continue to focus on functional strengthening at this time. No change in knee flexion range of motion despite strong focus on this area. Will continue to progress with improving functional range of motion.        The patient will continue to benefit from skilled outpatient physical therapy in order to address the deficits listed in the problem list on the initial evaluation, provide patient and family education, and maximize the patients level of independence in the home and community environments.     The patient's spiritual, cultural, and educational needs were considered, and the patient is agreeable to the plan of care and goals.           Plan: continue per poc    Goals:   Active       Ambulation/movement       Patient will perform TUG in < 13 seconds with least restrictive assisive device to reduce risk for falls.  (Progressing)       Start:  05/23/25    Expected End:  07/07/25            Patient will ascend at least 5 steps for stair navigation  (Progressing)       Start:  05/23/25    Expected End:  07/07/25            Patient will descend at least 5 steps for stair navigation  (Progressing)       Start:  05/23/25    Expected End:  07/07/25               Functional outcome       Patient will show a significant change in FOTO patient-reported outcome tool to demonstrate subjective improvement (Progressing)       Start:  05/23/25    Expected End:  07/07/25            Patient stated goal: get back to normal activities  (Progressing)       Start:  05/23/25    Expected End:  07/07/25            Patient will demonstrate independence in home program for support of progression (Progressing)       Start:  05/23/25    Expected End:  07/07/25               Pain       Patient will report pain of < 2/10  demonstrating a reduction of overall pain (Progressing)       Start:  05/23/25    Expected End:  07/07/25            Patient will report a 2 point reduction in pain while performing prolonged walking (Progressing)       Start:  05/23/25    Expected End:  07/07/25               Range of Motion       Patient will achieve right knee ROM of  0-110 degrees  (Progressing)       Start:  05/23/25    Expected End:  07/07/25               Strength       Patient will achieve right knee extension strength of 4/5 (Progressing)       Start:  05/23/25    Expected End:  07/07/25                Marlin Ramirez, PT

## 2025-06-25 ENCOUNTER — CLINICAL SUPPORT (OUTPATIENT)
Dept: REHABILITATION | Facility: HOSPITAL | Age: 75
End: 2025-06-25
Payer: MEDICARE

## 2025-06-25 DIAGNOSIS — M25.661 DECREASED RANGE OF MOTION (ROM) OF RIGHT KNEE: Primary | ICD-10-CM

## 2025-06-25 PROCEDURE — 97530 THERAPEUTIC ACTIVITIES: CPT

## 2025-06-27 ENCOUNTER — CLINICAL SUPPORT (OUTPATIENT)
Dept: REHABILITATION | Facility: HOSPITAL | Age: 75
End: 2025-06-27
Payer: MEDICARE

## 2025-06-27 DIAGNOSIS — M25.661 DECREASED RANGE OF MOTION (ROM) OF RIGHT KNEE: Primary | ICD-10-CM

## 2025-06-27 PROCEDURE — 97530 THERAPEUTIC ACTIVITIES: CPT | Mod: CQ

## 2025-06-27 NOTE — PROGRESS NOTES
Outpatient Rehab    Physical Therapy Visit    Patient Name: Luli Triplett  MRN: [id number]  YOB: 1950  Encounter Date: 6/27/2025    Therapy Diagnosis:   Encounter Diagnosis   Name Primary?    Decreased range of motion (ROM) of right knee Yes     Physician: Amos Newman MD    Physician Orders: Eval and Treat  Medical Diagnosis: Pain in right knee  Unilateral primary osteoarthritis, right knee  Muscle weakness (generalized)  Surgical Diagnosis: Right TKA   Surgical Date: 5/5/2025  Days Since Last Surgery: 53    Visit # / Visits Authorized:  15 / 20  Insurance Authorization Period: 4/29/2025 to 12/31/2025  Date of Evaluation: 5/23/2025  Plan of Care Certification: 5/26/2025 to 7/7/2025      PT/PTA: PTA   Number of PTA visits since last PT visit:1  Time In: 0953   Time Out: 1102  Total Time (in minutes): 69   Total Billable Time (in minutes): 23    FOTO:  Intake Score: 59%  Survey Score 2: 70%  Survey Score 3:  %    Precautions: none    Subjective   Patient reports she dropped her cane on her knee, she notes pain at the time. She is doing ok right now. She has been walking around at home with her cane.  Pain reported as 0/10. Right knee    Objective   Range of Motion:   Knee Right Active   Flexion Post: 96 degrees AAROM     Treatment:  Balance/Neuromuscular Re-Education  NMR 1: Patient education: HEP compliance, swelling management, ROM expectations post TKA, importance of extension ROM for proper gait cycle  NMR 3: heel prop for 5 minutes  NMR 4: Heel slides to improve tissue extensibility and ROM: 15 reps with 10 sec hold with therapist assist to maintain neutral hip alignment and foot position  NMR 6: quad set with heel prop + small ball under knee: 5 minutes with 5 sec holds; standing tke with ball: 10 second hold, 5 minutes  Therapeutic Activity  TA 1: NuStep for 10 minutes, Lvl 2 for gait mechanics  TA 2: Sit to stands from hi-lo mat: 3 x 10 reps with significant cueing for weight shifting, nose  "over toes, posterior weight shifting with sitting  TA 3: step ups, 4" step: 3 x 10 reps/RLE  TA 4: weight shifting with verbal cues for proper alignment, TKE in midstance, knee flexion in non weighbearing LE, upright posture  TA 5: ambulating without an AD with verbal cues to increase push off in terminal contact in // bars    Time Entry(in minutes):  Hot/Cold Pack Time Entry: 10  Neuromuscular Re-Education Time Entry: 31  Therapeutic Activity Time Entry: 38    Assessment & Plan   Assessment: Luli is 7 weeks, 4 days s/p Right TKA. Presents ambulating with rolling walker. Continue to emphasize quad strength and motor control throughout session with good tolerance. Significant difficulty maintaining neutral hip alignment with heel slides requiring therapist assist. Measured 96 degrees AAROM Flexion post session.    Evaluation/Treatment Tolerance: Patient tolerated treatment well    The patient will continue to benefit from skilled outpatient physical therapy in order to address the deficits listed in the problem list on the initial evaluation, provide patient and family education, and maximize the patients level of independence in the home and community environments.     The patient's spiritual, cultural, and educational needs were considered, and the patient is agreeable to the plan of care and goals.           Plan: Will continue per POC towards treatment goals. PT/PTA met face to face to discuss patient's treatment plan and progress towards established goals. Patient will be seen by physical therapist every sixth visit and minimally once per month.    Goals:   Active       Ambulation/movement       Patient will perform TUG in < 13 seconds with least restrictive assisive device to reduce risk for falls.  (Progressing)       Start:  05/23/25    Expected End:  07/07/25            Patient will ascend at least 5 steps for stair navigation  (Progressing)       Start:  05/23/25    Expected End:  07/07/25            " Patient will descend at least 5 steps for stair navigation  (Progressing)       Start:  05/23/25    Expected End:  07/07/25               Functional outcome       Patient will show a significant change in FOTO patient-reported outcome tool to demonstrate subjective improvement (Progressing)       Start:  05/23/25    Expected End:  07/07/25            Patient stated goal: get back to normal activities  (Progressing)       Start:  05/23/25    Expected End:  07/07/25            Patient will demonstrate independence in home program for support of progression (Progressing)       Start:  05/23/25    Expected End:  07/07/25               Pain       Patient will report pain of < 2/10 demonstrating a reduction of overall pain (Progressing)       Start:  05/23/25    Expected End:  07/07/25            Patient will report a 2 point reduction in pain while performing prolonged walking (Progressing)       Start:  05/23/25    Expected End:  07/07/25               Range of Motion       Patient will achieve right knee ROM of  0-110 degrees  (Progressing)       Start:  05/23/25    Expected End:  07/07/25               Strength       Patient will achieve right knee extension strength of 4/5 (Progressing)       Start:  05/23/25    Expected End:  07/07/25                Kassie Rosen PTA

## 2025-06-30 ENCOUNTER — CLINICAL SUPPORT (OUTPATIENT)
Dept: REHABILITATION | Facility: HOSPITAL | Age: 75
End: 2025-06-30
Payer: MEDICARE

## 2025-06-30 DIAGNOSIS — M25.661 DECREASED RANGE OF MOTION (ROM) OF RIGHT KNEE: Primary | ICD-10-CM

## 2025-06-30 PROCEDURE — 97530 THERAPEUTIC ACTIVITIES: CPT | Mod: CQ

## 2025-06-30 NOTE — PROGRESS NOTES
Outpatient Rehab    Physical Therapy Progress Note    Patient Name: Luli Triplett  MRN: [id number]  YOB: 1950  Encounter Date: 2025    Therapy Diagnosis:   Encounter Diagnosis   Name Primary?    Decreased range of motion (ROM) of right knee Yes     Physician: Amos Newman MD    Physician Orders: Eval and Treat  Medical Diagnosis: Pain in right knee  Unilateral primary osteoarthritis, right knee  Muscle weakness (generalized)  Surgical Diagnosis: Right TKA   Surgical Date: 2025  Days Since Last Surgery: 56    Visit # / Visits Authorized:    Insurance Authorization Period: 2025 to 2025  Date of Evaluation: 2025  Plan of Care Certification: 2025 to 2025      PT/PTA: PT   Number of PTA visits since last PT visit:0  Time In: 1225   Time Out: 1321  Total Time (in minutes): 56   Total Billable Time (in minutes): 23    FOTO:  Intake Score:  %  Survey Score 2:  %  Survey Score 3:  %    Precautions:       Subjective   feels like she is happy with how far she has come and the MD office said that she is doing well considering she had setbacks before. She states that she wants to stop using her walker.  Pain reported as 0/10.      Objective          Reassessment  ROM: 0 - 82 degrees Active; 89 degrees AA  Strength: 4/5   TU seconds with front wheeled walker  30SSTS: 5x    Treatment:  Balance/Neuromuscular Re-Education  NMR 1: Patient education: HEP compliance, swelling management, ROM expectations post TKA, importance of extension ROM for proper gait cycle  NMR 2: LAQs at EOM + 3 lb AW: 10 sec hold x 5 minutes  NMR 3: heel prop for 5 minutes  NMR 4: Heel slides to improve tissue extensibility and ROM: 15 reps with 10 sec hold with therapist assist to maintain neutral hip alignment and foot position  NMR 6: quad set with heel prop + small ball under knee: 5 minutes with 5 sec holds; standing tke with ball: 10 second hold, 5 minutes  Therapeutic Activity  TA 1: NuStep  "for 10 minutes, Lvl 2 for gait mechanics  TA 2: Sit to stands from hi-lo mat: 3 x 10 reps with significant cueing for weight shifting, nose over toes, posterior weight shifting with sitting  TA 3: step ups, 4" step: 3 x 10 reps/RLE  TA 4: weight shifting with verbal cues for proper alignment, TKE in midstance, knee flexion in non weighbearing LE, upright posture  TA 5: ambulating without an AD with verbal cues to increase push off in terminal contact in // bars  TA 6: Reassessment    Time Entry(in minutes):  Neuromuscular Re-Education Time Entry: 23  Therapeutic Activity Time Entry: 33    Assessment & Plan   Assessment: Luli has attended physical therapy for 15 visits for status post tka and is progressing steadily with therapy interventions. She has demonstrated improvement with range of motion, strength, TUG, 30SSTS and FOTO score. Patient's main functional limitation remains gross knee range of motion and functional strength as compared to normative values and contralateral side. Patient will benefit from continued skilled therapy to address these remaining deficits and return to prior level of function        The patient will continue to benefit from skilled outpatient physical therapy in order to address the deficits listed in the problem list on the initial evaluation, provide patient and family education, and maximize the patients level of independence in the home and community environments.     The patient's spiritual, cultural, and educational needs were considered, and the patient is agreeable to the plan of care and goals.           Plan: Continue per protocol    Goals:   Active       Ambulation/movement       Patient will perform TUG in < 13 seconds with least restrictive assisive device to reduce risk for falls.  (Progressing)       Start:  05/23/25    Expected End:  07/07/25            Patient will ascend at least 5 steps for stair navigation  (Progressing)       Start:  05/23/25    Expected End:  " 07/07/25            Patient will descend at least 5 steps for stair navigation  (Progressing)       Start:  05/23/25    Expected End:  07/07/25               Functional outcome       Patient will show a significant change in FOTO patient-reported outcome tool to demonstrate subjective improvement (Progressing)       Start:  05/23/25    Expected End:  07/07/25            Patient stated goal: get back to normal activities  (Progressing)       Start:  05/23/25    Expected End:  07/07/25            Patient will demonstrate independence in home program for support of progression (Progressing)       Start:  05/23/25    Expected End:  07/07/25               Pain       Patient will report pain of < 2/10 demonstrating a reduction of overall pain (Progressing)       Start:  05/23/25    Expected End:  07/07/25            Patient will report a 2 point reduction in pain while performing prolonged walking (Progressing)       Start:  05/23/25    Expected End:  07/07/25               Range of Motion       Patient will achieve right knee ROM of  0-110 degrees  (Progressing)       Start:  05/23/25    Expected End:  07/07/25               Strength       Patient will achieve right knee extension strength of 4/5 (Progressing)       Start:  05/23/25    Expected End:  07/07/25                Marlin Ramirez, PT

## 2025-06-30 NOTE — PROGRESS NOTES
"  Outpatient Rehab    Physical Therapy Visit    Patient Name: Luli Triplett  MRN: [id number]  YOB: 1950  Encounter Date: 6/30/2025    Therapy Diagnosis:   Encounter Diagnosis   Name Primary?    Decreased range of motion (ROM) of right knee Yes     Physician: Amos Newman MD    Physician Orders: Eval and Treat  Medical Diagnosis: Pain in right knee  Unilateral primary osteoarthritis, right knee  Muscle weakness (generalized)  Surgical Diagnosis: Right TKA   Surgical Date: 5/5/2025  Days Since Last Surgery: 56    Visit # / Visits Authorized:  16 / 20  Insurance Authorization Period: 4/29/2025 to 12/31/2025  Date of Evaluation: 5/23/2025  Plan of Care Certification: 5/26/2025 to 7/7/2025      PT/PTA: PTA   Number of PTA visits since last PT visit:2  Time In: 1230   Time Out: 1326  Total Time (in minutes): 56   Total Billable Time (in minutes): 23    FOTO:  Intake Score: 59%  Survey Score 2: 70%  Survey Score 3:  %    Precautions: none    Subjective   Patient reports no new complaints today.  Pain reported as 0/10. Right knee    Objective  Objective Measures updated at progress report unless specified.     Treatment:  Balance/Neuromuscular Re-Education  NMR 1: Patient education: HEP compliance, swelling management, ROM expectations post TKA, importance of extension ROM for proper gait cycle  NMR 2: LAQs at EOM + 3 lb AW: 10 sec hold x 5 minutes  NMR 6: quad set with heel prop + small ball under knee: 5 minutes with 5 sec holds; standing tke with ball: 10 second hold, 5 minutes  Therapeutic Activity  TA 1: NuStep for 10 minutes, Lvl 2 for gait mechanics  TA 2: Sit to stands from hi-lo mat: 3 x 10 reps with significant cueing for weight shifting, nose over toes, posterior weight shifting with sitting  TA 3: step ups, 4" step: 3 x 10 reps/RLE    Time Entry(in minutes):  Neuromuscular Re-Education Time Entry: 23  Therapeutic Activity Time Entry: 33    Assessment & Plan   Assessment: Luli is 8 weeks, 0 days " s/p Right TKA. Presents ambulating with rolling walker. Good tolerance overall noting adequate muscle response throughout.    Evaluation/Treatment Tolerance: Patient tolerated treatment well    The patient will continue to benefit from skilled outpatient physical therapy in order to address the deficits listed in the problem list on the initial evaluation, provide patient and family education, and maximize the patients level of independence in the home and community environments.     The patient's spiritual, cultural, and educational needs were considered, and the patient is agreeable to the plan of care and goals.           Plan: Will continue per POC towards treatment goals. PT/PTA met face to face to discuss patient's treatment plan and progress towards established goals. Patient will be seen by physical therapist every sixth visit and minimally once per month.    Goals:   Active       Ambulation/movement       Patient will perform TUG in < 13 seconds with least restrictive assisive device to reduce risk for falls.  (Progressing)       Start:  05/23/25    Expected End:  07/07/25            Patient will ascend at least 5 steps for stair navigation  (Progressing)       Start:  05/23/25    Expected End:  07/07/25            Patient will descend at least 5 steps for stair navigation  (Progressing)       Start:  05/23/25    Expected End:  07/07/25               Functional outcome       Patient will show a significant change in FOTO patient-reported outcome tool to demonstrate subjective improvement (Progressing)       Start:  05/23/25    Expected End:  07/07/25            Patient stated goal: get back to normal activities  (Progressing)       Start:  05/23/25    Expected End:  07/07/25            Patient will demonstrate independence in home program for support of progression (Progressing)       Start:  05/23/25    Expected End:  07/07/25               Pain       Patient will report pain of < 2/10 demonstrating a  reduction of overall pain (Progressing)       Start:  05/23/25    Expected End:  07/07/25            Patient will report a 2 point reduction in pain while performing prolonged walking (Progressing)       Start:  05/23/25    Expected End:  07/07/25               Range of Motion       Patient will achieve right knee ROM of  0-110 degrees  (Progressing)       Start:  05/23/25    Expected End:  07/07/25               Strength       Patient will achieve right knee extension strength of 4/5 (Progressing)       Start:  05/23/25    Expected End:  07/07/25                Kassie Rosen, PTA

## 2025-07-02 ENCOUNTER — CLINICAL SUPPORT (OUTPATIENT)
Dept: REHABILITATION | Facility: HOSPITAL | Age: 75
End: 2025-07-02
Payer: MEDICARE

## 2025-07-02 DIAGNOSIS — M25.661 DECREASED RANGE OF MOTION (ROM) OF RIGHT KNEE: Primary | ICD-10-CM

## 2025-07-02 PROCEDURE — 97530 THERAPEUTIC ACTIVITIES: CPT | Mod: CQ

## 2025-07-02 NOTE — PROGRESS NOTES
"  Outpatient Rehab    Physical Therapy Visit    Patient Name: Luli Triplett  MRN: [id number]  YOB: 1950  Encounter Date: 7/2/2025    Therapy Diagnosis:   Encounter Diagnosis   Name Primary?    Decreased range of motion (ROM) of right knee Yes     Physician: Amos Newman MD    Physician Orders: Eval and Treat  Medical Diagnosis: Pain in right knee  Unilateral primary osteoarthritis, right knee  Muscle weakness (generalized)  Surgical Diagnosis: Right TKA   Surgical Date: 5/5/2025  Days Since Last Surgery: 58    Visit # / Visits Authorized:  17 / 20  Insurance Authorization Period: 4/29/2025 to 12/31/2025  Date of Evaluation: 5/23/2025  Plan of Care Certification: 5/26/2025 to 7/7/2025      PT/PTA: PTA   Number of PTA visits since last PT visit:3  Time In: 1230   Time Out: 1320  Total Time (in minutes): 50   Total Billable Time (in minutes): 23    FOTO:  Intake Score: 59%  Survey Score 2: 70%  Survey Score 3:  %    Precautions: none    Subjective   Patient reports waking up during the night with pain, she thinks it was cramping from lack of hydration.  Pain reported as 2/10. Right knee    Objective  Objective Measures updated at progress report unless specified.     Treatment:  Balance/Neuromuscular Re-Education  NMR 1: Patient education: HEP compliance, swelling management, ROM expectations post TKA, importance of extension ROM for proper gait cycle  NMR 2: LAQs at EOM + 3 lb AW: 10 sec hold x 5 minutes  NMR 6: quad set with heel prop + small ball under knee: 5 minutes with 5 sec holds (HELD); standing tke with ball: 10 second hold, 5 minutes  Therapeutic Activity  TA 1: NuStep for 10 minutes, Lvl 2 for gait mechanics  TA 2: Sit to stands from hi-lo mat: 3 x 10 reps with significant cueing for weight shifting, nose over toes, posterior weight shifting with sitting  TA 3: step ups, 4" step: 3 x 10 reps/RLE    Time Entry(in minutes):  Neuromuscular Re-Education Time Entry: 12  Therapeutic Activity Time " Entry: 38    Assessment & Plan   Assessment: Luli is 8 weeks, 2 days s/p Right TKA. Presents ambulating with rolling walker. Increased fatigue reported throughout session likely due to increased activity yesterday.  Evaluation/Treatment Tolerance: Patient limited by fatigue    The patient will continue to benefit from skilled outpatient physical therapy in order to address the deficits listed in the problem list on the initial evaluation, provide patient and family education, and maximize the patients level of independence in the home and community environments.     The patient's spiritual, cultural, and educational needs were considered, and the patient is agreeable to the plan of care and goals.           Plan: Will continue per POC towards treatment goals. PT/PTA met face to face to discuss patient's treatment plan and progress towards established goals. Patient will be seen by physical therapist every sixth visit and minimally once per month.    Goals:   Active       Ambulation/movement       Patient will perform TUG in < 13 seconds with least restrictive assisive device to reduce risk for falls.  (Progressing)       Start:  05/23/25    Expected End:  07/07/25            Patient will ascend at least 5 steps for stair navigation  (Progressing)       Start:  05/23/25    Expected End:  07/07/25            Patient will descend at least 5 steps for stair navigation  (Progressing)       Start:  05/23/25    Expected End:  07/07/25               Functional outcome       Patient will show a significant change in FOTO patient-reported outcome tool to demonstrate subjective improvement (Progressing)       Start:  05/23/25    Expected End:  07/07/25            Patient stated goal: get back to normal activities  (Progressing)       Start:  05/23/25    Expected End:  07/07/25            Patient will demonstrate independence in home program for support of progression (Progressing)       Start:  05/23/25    Expected End:   07/07/25               Pain       Patient will report pain of < 2/10 demonstrating a reduction of overall pain (Progressing)       Start:  05/23/25    Expected End:  07/07/25            Patient will report a 2 point reduction in pain while performing prolonged walking (Progressing)       Start:  05/23/25    Expected End:  07/07/25               Range of Motion       Patient will achieve right knee ROM of  0-110 degrees  (Progressing)       Start:  05/23/25    Expected End:  07/07/25               Strength       Patient will achieve right knee extension strength of 4/5 (Progressing)       Start:  05/23/25    Expected End:  07/07/25                Kassie Rosen, PTA

## 2025-07-03 ENCOUNTER — CLINICAL SUPPORT (OUTPATIENT)
Dept: REHABILITATION | Facility: HOSPITAL | Age: 75
End: 2025-07-03
Payer: MEDICARE

## 2025-07-03 DIAGNOSIS — M25.661 DECREASED RANGE OF MOTION (ROM) OF RIGHT KNEE: Primary | ICD-10-CM

## 2025-07-03 PROCEDURE — 97112 NEUROMUSCULAR REEDUCATION: CPT

## 2025-07-03 PROCEDURE — 97530 THERAPEUTIC ACTIVITIES: CPT

## 2025-07-03 NOTE — PROGRESS NOTES
Outpatient Rehab    Physical Therapy Visit    Patient Name: Luli Triplett  MRN: [id number]  YOB: 1950  Encounter Date: 7/3/2025    Therapy Diagnosis:   Encounter Diagnosis   Name Primary?    Decreased range of motion (ROM) of right knee Yes     Physician: Amos Newman MD    Physician Orders: Eval and Treat  Medical Diagnosis: Pain in right knee  Unilateral primary osteoarthritis, right knee  Muscle weakness (generalized)  Surgical Diagnosis: Right TKA   Surgical Date: 5/5/2025  Days Since Last Surgery: 59    Visit # / Visits Authorized:  18 / 20  Insurance Authorization Period: 4/29/2025 to 12/31/2025  Date of Evaluation: 5/23/2025  Plan of Care Certification: 5/26/2025 to 7/7/2025      PT/PTA: PT   Number of PTA visits since last PT visit:0  Time In: 1230   Time Out: 1323  Total Time (in minutes): 53   Total Billable Time (in minutes): 53    FOTO:  Intake Score:  %  Survey Score 2:  %  Survey Score 3:  %    Precautions:       Subjective   patient reports that she is doing okay and feels she is about 85% improved since beginning physical therapy..  Pain reported as 2/10.      Objective            Treatment:  Balance/Neuromuscular Re-Education  NMR 1: Patient education: HEP compliance, swelling management, ROM expectations post TKA, importance of extension ROM for proper gait cycle  NMR 2: LAQs at EOM + 3 lb AW: 10 sec hold x 5 minutes  NMR 3: heel prop for 5 minutes  NMR 4: Heel slides to improve tissue extensibility and ROM: 15 reps with 10 sec hold with therapist assist to maintain neutral hip alignment and foot position  NMR 6: quad set with heel prop + small ball under knee: 5 minutes with 5 sec holds (HELD); standing tke with ball: 10 second hold, 5 minutes  Therapeutic Activity  TA 1: NuStep for 10 minutes, Lvl 2 for gait mechanics  TA 2: Sit to stands from hi-lo mat: 3 x 10 reps with significant cueing for weight shifting, nose over toes, posterior weight shifting with sitting  TA 3: step  "ups, 4" step: 3 x 10 reps/RLE  TA 4: weight shifting with verbal cues for proper alignment, TKE in midstance, knee flexion in non weighbearing LE, upright posture  TA 5: ambulating without an AD with verbal cues to increase push off in terminal contact in // bars    Time Entry(in minutes):  Neuromuscular Re-Education Time Entry: 15  Therapeutic Activity Time Entry: 38    Assessment & Plan   Assessment: Interventions continue to target gross functional movement patterns for improved overall mobility. Patient is able to perform all exercises with good quadriceps control and only limitation is knee flexion range of motion. Patient and therapist discussed possible DC from therapy in upcoming sessions with home exercise program pending authorized therapy visits.        The patient will continue to benefit from skilled outpatient physical therapy in order to address the deficits listed in the problem list on the initial evaluation, provide patient and family education, and maximize the patients level of independence in the home and community environments.     The patient's spiritual, cultural, and educational needs were considered, and the patient is agreeable to the plan of care and goals.           Plan: Continue per POC; possible discharge vs updated POC in upcoming visits    Goals:   Active       Ambulation/movement       Patient will perform TUG in < 13 seconds with least restrictive assisive device to reduce risk for falls.  (Ongoing)       Start:  05/23/25    Expected End:  07/07/25            Patient will ascend at least 5 steps for stair navigation  (Ongoing)       Start:  05/23/25    Expected End:  07/07/25            Patient will descend at least 5 steps for stair navigation  (Ongoing)       Start:  05/23/25    Expected End:  07/07/25               Functional outcome       Patient will show a significant change in FOTO patient-reported outcome tool to demonstrate subjective improvement (Ongoing)       Start:  " 05/23/25    Expected End:  07/07/25            Patient stated goal: get back to normal activities  (Ongoing)       Start:  05/23/25    Expected End:  07/07/25            Patient will demonstrate independence in home program for support of progression (Ongoing)       Start:  05/23/25    Expected End:  07/07/25               Pain       Patient will report pain of < 2/10 demonstrating a reduction of overall pain (Ongoing)       Start:  05/23/25    Expected End:  07/07/25            Patient will report a 2 point reduction in pain while performing prolonged walking (Ongoing)       Start:  05/23/25    Expected End:  07/07/25               Range of Motion       Patient will achieve right knee ROM of  0-110 degrees  (Ongoing)       Start:  05/23/25    Expected End:  07/07/25               Strength       Patient will achieve right knee extension strength of 4/5 (Ongoing)       Start:  05/23/25    Expected End:  07/07/25                Marlin Ramirez, PT

## 2025-07-09 ENCOUNTER — CLINICAL SUPPORT (OUTPATIENT)
Dept: REHABILITATION | Facility: HOSPITAL | Age: 75
End: 2025-07-09
Payer: MEDICARE

## 2025-07-09 DIAGNOSIS — M25.661 DECREASED RANGE OF MOTION (ROM) OF RIGHT KNEE: Primary | ICD-10-CM

## 2025-07-09 PROCEDURE — 97112 NEUROMUSCULAR REEDUCATION: CPT | Mod: CQ

## 2025-07-09 PROCEDURE — 97530 THERAPEUTIC ACTIVITIES: CPT | Mod: CQ

## 2025-07-09 NOTE — PROGRESS NOTES
"  Outpatient Rehab    Physical Therapy Visit    Patient Name: Luli Triplett  MRN: [id number]  YOB: 1950  Encounter Date: 7/9/2025    Therapy Diagnosis:   Encounter Diagnosis   Name Primary?    Decreased range of motion (ROM) of right knee Yes     Physician: Amos Newman MD    Physician Orders: Eval and Treat  Medical Diagnosis: Pain in right knee  Unilateral primary osteoarthritis, right knee  Muscle weakness (generalized)  Surgical Diagnosis: Right TKA   Surgical Date: 5/5/2025  Days Since Last Surgery: 65    Visit # / Visits Authorized:  19 / 20  Insurance Authorization Period: 4/29/2025 to 12/31/2025  Date of Evaluation: 5/23/2025  Plan of Care Certification: 5/26/2025 to 7/7/2025      PT/PTA: PTA   Number of PTA visits since last PT visit:1  Time In: 0805   Time Out: 0900  Total Time (in minutes): 55   Total Billable Time (in minutes): 55    FOTO:  Intake Score:  %  Survey Score 2:  %  Survey Score 3:  %    Precautions:       Subjective   is using SPC around he house.  Pain reported as 0/10.      Objective            Treatment:  Balance/Neuromuscular Re-Education  NMR 1: Patient education: HEP compliance, swelling management, ROM expectations post TKA, importance of extension ROM for proper gait cycle  NMR 3: heel prop with 4 lbs above & 3 lbs below knee: 3 minutes  NMR 4: Heel slides to improve tissue extensibility and ROM: 15 reps with 10 sec hold with therapist assist to maintain neutral hip alignment and foot position  NMR 6: quad set with heel prop + small ball under knee: 5 minutes with 5 sec holds; standing tke with ball: 1x10 with 5 sec holds  Therapeutic Activity  TA 1: NuStep for 10 minutes, Lvl 2 for gait mechanics  TA 2: Sit to stands from hi-lo mat: 2 x 10 reps with significant cueing for weight shifting, nose over toes, posterior weight shifting with sitting; half squats with UE support: 2x10  TA 3: step ups, 4" step: 3 x 10 reps/RLE  TA 4: weight shifting with verbal cues for " proper alignment, TKE in midstance, knee flexion in non weighbearing LE, upright posture  TA 5: ambulating without an AD with verbal cues to increase push off in terminal contact in // bars    Time Entry(in minutes):  Neuromuscular Re-Education Time Entry: 17  Therapeutic Activity Time Entry: 38    Assessment & Plan   Assessment: Interventions continue to target gross functional movement patterns for improved overall mobility. Patient is able to perform all exercises with good quadriceps control and only limitation is knee flexion range of motion. Patient is aware of possible DC from therapy in upcoming sessions with home exercise program pending authorized therapy visits.        The patient will continue to benefit from skilled outpatient physical therapy in order to address the deficits listed in the problem list on the initial evaluation, provide patient and family education, and maximize the patients level of independence in the home and community environments.     The patient's spiritual, cultural, and educational needs were considered, and the patient is agreeable to the plan of care and goals.           Plan: Continue per POC; possible discharge vs updated POC in upcoming visits    Goals:   Active       Ambulation/movement       Patient will perform TUG in < 13 seconds with least restrictive assisive device to reduce risk for falls.  (Ongoing)       Start:  05/23/25    Expected End:  07/07/25            Patient will ascend at least 5 steps for stair navigation  (Ongoing)       Start:  05/23/25    Expected End:  07/07/25            Patient will descend at least 5 steps for stair navigation  (Ongoing)       Start:  05/23/25    Expected End:  07/07/25               Functional outcome       Patient will show a significant change in FOTO patient-reported outcome tool to demonstrate subjective improvement (Ongoing)       Start:  05/23/25    Expected End:  07/07/25            Patient stated goal: get back to normal  activities  (Ongoing)       Start:  05/23/25    Expected End:  07/07/25            Patient will demonstrate independence in home program for support of progression (Ongoing)       Start:  05/23/25    Expected End:  07/07/25               Pain       Patient will report pain of < 2/10 demonstrating a reduction of overall pain (Ongoing)       Start:  05/23/25    Expected End:  07/07/25            Patient will report a 2 point reduction in pain while performing prolonged walking (Ongoing)       Start:  05/23/25    Expected End:  07/07/25               Range of Motion       Patient will achieve right knee ROM of  0-110 degrees  (Ongoing)       Start:  05/23/25    Expected End:  07/07/25               Strength       Patient will achieve right knee extension strength of 4/5 (Ongoing)       Start:  05/23/25    Expected End:  07/07/25                Bernard Watts, PTA

## 2025-07-11 ENCOUNTER — CLINICAL SUPPORT (OUTPATIENT)
Dept: REHABILITATION | Facility: HOSPITAL | Age: 75
End: 2025-07-11
Payer: MEDICARE

## 2025-07-11 DIAGNOSIS — M25.661 DECREASED RANGE OF MOTION (ROM) OF RIGHT KNEE: Primary | ICD-10-CM

## 2025-07-11 PROCEDURE — 97530 THERAPEUTIC ACTIVITIES: CPT | Mod: CQ

## 2025-07-11 PROCEDURE — 97112 NEUROMUSCULAR REEDUCATION: CPT | Mod: CQ

## 2025-07-11 NOTE — PROGRESS NOTES
"  Outpatient Rehab    Physical Therapy Visit    Patient Name: Luli Triplett  MRN: [id number]  YOB: 1950  Encounter Date: 7/11/2025    Therapy Diagnosis:   Encounter Diagnosis   Name Primary?    Decreased range of motion (ROM) of right knee Yes     Physician: Amos Newman MD    Physician Orders: Eval and Treat  Medical Diagnosis: Pain in right knee  Unilateral primary osteoarthritis, right knee  Muscle weakness (generalized)  Surgical Diagnosis: Right TKA   Surgical Date: 5/5/2025  Days Since Last Surgery: 67    Visit # / Visits Authorized:  20 / 20  Insurance Authorization Period: 4/29/2025 to 12/31/2025  Date of Evaluation: 5/23/2025  Plan of Care Certification: 5/26/2025 to 7/7/2025      PT/PTA: PTA   Number of PTA visits since last PT visit:2  Time In: 0805   Time Out: 0905  Total Time (in minutes): 60   Total Billable Time (in minutes): 60    FOTO:  Intake Score:  %  Survey Score 2:  %  Survey Score 3:  %    Precautions:       Subjective   feels that she needs to wear tennis shoes in her house since the slippers alter her gait.  Pain reported as 0/10.      Objective            Treatment:  Balance/Neuromuscular Re-Education  NMR 1: Patient education: HEP compliance  NMR 3: heel prop with 5 lbs above & 4 lbs below knee: 3 minutes  NMR 4: Heel slides to improve tissue extensibility and ROM: 10 reps with 10 sec hold with therapist assist to maintain neutral hip alignment and foot position  NMR 5: standing tke with cook band: 1x10 with 5 sec holds  NMR 6: shuttle - U leg press, 25 lbs: 3x10  NMR 7: bridge + iso hip abd, green tb: 3x10 supine  NMR 8: supine single leg clams, green tb: 2x10/LE  Therapeutic Activity  TA 1: NuStep for 10 minutes, Lvl 2 for gait mechanics  TA 2: Sit to stands from hi-lo mat: 1 x 12 reps with significant cueing for weight shifting, nose over toes, posterior weight shifting with sitting  TA 3: step ups, 4" step: 3 x 10 reps/RLE  TA 4: ambulating without an AD with verbal " cues for glut activation to increase lateral stability  TA 5: -  TA 6: -    Time Entry(in minutes):  Neuromuscular Re-Education Time Entry: 35  Therapeutic Activity Time Entry: 25    Assessment & Plan   Assessment: Continued to implement interventions continue to target gross functional movement patterns for improved overall mobility. Patient is able to perform all exercises with good quadriceps control and only limitation is knee flexion range of motion. Patient, however, does demonstrate lateral instability. Hence, HEP was expanded in order to promote hip and gait stability. Patient was discharged today with separate documentation by PTA of record.         The patient will continue to benefit from skilled outpatient physical therapy in order to address the deficits listed in the problem list on the initial evaluation, provide patient and family education, and maximize the patients level of independence in the home and community environments.     The patient's spiritual, cultural, and educational needs were considered, and the patient is agreeable to the plan of care and goals.           Plan: Will continue per POC towards treatment goals. PT/PTA met face to face to discuss patient's treatment plan and progress towards established goals. Patient will be seen by physical therapist every sixth visit and minimally once per month.    Goals:   Active       Ambulation/movement       Patient will perform TUG in < 13 seconds with least restrictive assisive device to reduce risk for falls.  (Ongoing)       Start:  05/23/25    Expected End:  07/07/25            Patient will ascend at least 5 steps for stair navigation  (Ongoing)       Start:  05/23/25    Expected End:  07/07/25            Patient will descend at least 5 steps for stair navigation  (Ongoing)       Start:  05/23/25    Expected End:  07/07/25               Functional outcome       Patient will show a significant change in FOTO patient-reported outcome tool to  demonstrate subjective improvement (Ongoing)       Start:  05/23/25    Expected End:  07/07/25            Patient stated goal: get back to normal activities  (Ongoing)       Start:  05/23/25    Expected End:  07/07/25            Patient will demonstrate independence in home program for support of progression (Ongoing)       Start:  05/23/25    Expected End:  07/07/25               Pain       Patient will report pain of < 2/10 demonstrating a reduction of overall pain (Ongoing)       Start:  05/23/25    Expected End:  07/07/25            Patient will report a 2 point reduction in pain while performing prolonged walking (Ongoing)       Start:  05/23/25    Expected End:  07/07/25               Range of Motion       Patient will achieve right knee ROM of  0-110 degrees  (Ongoing)       Start:  05/23/25    Expected End:  07/07/25               Strength       Patient will achieve right knee extension strength of 4/5 (Ongoing)       Start:  05/23/25    Expected End:  07/07/25                Bernard Watts, PTA

## 2025-08-05 ENCOUNTER — OFFICE VISIT (OUTPATIENT)
Dept: ORTHOPEDICS | Facility: CLINIC | Age: 75
End: 2025-08-05
Payer: MEDICARE

## 2025-08-05 ENCOUNTER — HOSPITAL ENCOUNTER (OUTPATIENT)
Facility: HOSPITAL | Age: 75
Discharge: HOME | End: 2025-08-05
Attending: PHYSICIAN ASSISTANT
Payer: MEDICARE

## 2025-08-05 VITALS — BODY MASS INDEX: 35.94 KG/M2 | WEIGHT: 195.31 LBS | HEIGHT: 62 IN

## 2025-08-05 DIAGNOSIS — Z96.651 AFTERCARE FOLLOWING RIGHT KNEE JOINT REPLACEMENT SURGERY: Primary | ICD-10-CM

## 2025-08-05 DIAGNOSIS — Z47.1 AFTERCARE FOLLOWING RIGHT KNEE JOINT REPLACEMENT SURGERY: Primary | ICD-10-CM

## 2025-08-05 DIAGNOSIS — Z96.651 AFTERCARE FOLLOWING RIGHT KNEE JOINT REPLACEMENT SURGERY: ICD-10-CM

## 2025-08-05 DIAGNOSIS — Z47.1 AFTERCARE FOLLOWING RIGHT KNEE JOINT REPLACEMENT SURGERY: ICD-10-CM

## 2025-08-05 DIAGNOSIS — M16.11 PRIMARY OSTEOARTHRITIS OF RIGHT HIP: ICD-10-CM

## 2025-08-05 PROCEDURE — 99024 POSTOP FOLLOW-UP VISIT: CPT | Mod: POP,,, | Performed by: PHYSICIAN ASSISTANT

## 2025-08-05 PROCEDURE — 99999 PR PBB SHADOW E&M-EST. PATIENT-LVL III: CPT | Mod: PBBFAC,,, | Performed by: PHYSICIAN ASSISTANT

## 2025-08-05 PROCEDURE — 99213 OFFICE O/P EST LOW 20 MIN: CPT | Mod: PBBFAC,25,PN | Performed by: PHYSICIAN ASSISTANT

## 2025-08-05 PROCEDURE — 73564 X-RAY EXAM KNEE 4 OR MORE: CPT | Mod: 26,RT,, | Performed by: RADIOLOGY

## 2025-08-05 PROCEDURE — 73564 X-RAY EXAM KNEE 4 OR MORE: CPT | Mod: TC,PN,RT

## 2025-08-05 NOTE — PROGRESS NOTES
Subjective:      Chief Complaint: Pain of the Right Knee and Follow-up (S/P R TKA 5/5/25)    Patient ID: Luli Triplett is a 75 y.o. female.  Patient is 3 months s/p  right primary total knee replacement  Anterior knee pain: No  Has improved pain  Is in physical therapy  No  Problems w incision  No  Is  happy with result  Yes  Opiod free: Yes  States she continues her home exercises.  Still notes gait with leg externally rotated. Is also having associated hip/groin pain.   Takes tylenol as needed for pain  Using walker today.            Past Medical History:   Diagnosis Date    High cholesterol     Hypertension     Macular degeneration         Past Surgical History:   Procedure Laterality Date    ARTHROPLASTY, KNEE, TOTAL, SIGHT ASSISTED Right 5/5/2025    Procedure: ARTHROPLASTY, KNEE, SIGHT ASSISTED;  Surgeon: Amos Newman MD;  Location: [facility];  Service: Orthopedics;  Laterality: Right;  bmi - 37.06    HYSTERECTOMY          Current Outpatient Medications   Medication Instructions    aspirin (ECOTRIN) 81 mg, Oral, 2 times daily    atorvastatin (LIPITOR) 40 mg, Daily    calcium carbonate (OS-PARAS) 600 mg, 2 times daily with meals    cetirizine (ZYRTEC) 10 mg, Daily    famotidine (PEPCID) 20 mg, Oral, 2 times daily    hydroCHLOROthiazide 12.5 mg, Daily    lisinopriL 10 mg, Daily    meclizine (ANTIVERT) 25 mg, Oral, 3 times daily PRN    metFORMIN (GLUCOPHAGE) 500 mg, With breakfast    MULTIVIT-MIN/FA/CALCIUM/VIT K1 (ONE-A-DAY WOMEN'S 50+ ORAL) Take by mouth.    suzetrigine 50 mg Tab Take 2 tablets by mouth as soon as you get home. The next morning start taking 1 tablet every 12 hours.    triamterene-hydrochlorothiazide 37.5-25 mg (DYAZIDE) 37.5-25 mg per capsule 1 capsule, Every morning        Review of patient's allergies indicates:   Allergen Reactions    Asa [aspirin]     Pcn [penicillins]     Codeine Nausea Only     Reaction to codeine in codeine in cough medicine. Nausea only.  Patient can tolerate Percocet.  "      Review of Systems   Constitutional: Negative for fever and malaise/fatigue.   Eyes:  Negative for blurred vision.   Cardiovascular:  Negative for chest pain.   Respiratory:  Negative for shortness of breath.    Skin:  Negative for poor wound healing.   Musculoskeletal:  Positive for joint pain and myalgias.   Genitourinary:  Negative for bladder incontinence.   Neurological:  Negative for dizziness, numbness and paresthesias.   Psychiatric/Behavioral:  Negative for altered mental status.        The patient's relevant past medical, surgical, and social history was reviewed in Epic.       Objective:      VITAL SIGNS: Ht 5' 2" (1.575 m)   Wt 88.6 kg (195 lb 5.2 oz)   BMI 35.73 kg/m²     General    Nursing note and vitals reviewed.  Constitutional: She is oriented to person, place, and time. She appears well-developed and well-nourished.   Neurological: She is alert and oriented to person, place, and time.     General Musculoskeletal Exam   Gait: abnormal       Right Knee Exam     Inspection   Scars: present    Tenderness   The patient is experiencing no tenderness.     Range of Motion   Extension:  5   Flexion:  80     Tests   Ligament Examination   MCL - Valgus: normal (0 to 2mm)  LCL - Varus: normal  Patella   Passive Patellar Tilt: neutral    Other   Sensation: normal    Comments:  Walks with hip externally rotated.  Limited IR/ER of the hip    Muscle Strength   Right Lower Extremity   Quadriceps:  5/5   Hamstrin/5     Vascular Exam     Right Pulses  Dorsalis Pedis:      2+  Posterior Tibial:      2+           Imaging  X-Ray Knee Complete 4 Or More Views Right  Narrative: EXAMINATION:  XR KNEE COMP 4 OR MORE VIEWS RIGHT    CLINICAL HISTORY:  Aftercare following joint replacement surgery    TECHNIQUE:  For more views right knee    COMPARISON:  2025    FINDINGS:  Total knee arthroplasty is seen the right in good position and alignment.  Impression: See above    Electronically signed by: Ventura" MD Jorge  Date:    08/05/2025  Time:    14:38    I have reviewed the above imaging and agree with the findings stated by the radiologist.           Assessment:       Luli Triplett is a 75 y.o. female seen in the office today for   1. Aftercare following right knee joint replacement surgery    2. Primary osteoarthritis of right hip    Overall patient appears to be doing well and is happy with the result of the knee arthroplasty. They can continue activities as tolerated avoiding high impact activities.  I would like to see the patient back In 3 months with xrays. Home exercise program given to her today.  Will place referral to hip specialist to discuss SONU.       Plan:       Luli was seen today for pain and follow-up.    Diagnoses and all orders for this visit:    Aftercare following right knee joint replacement surgery    Primary osteoarthritis of right hip  -     Ambulatory referral/consult to Orthopedics; Future          Diagnoses and plan discussed with the patient, as well as the expected course and duration of his symptoms.  All questions and concerns were addressed prior to the end of the visit.   Instructed patient to call office if they have any future questions/concerns or to schedule apt. Patient will return to see me if symptoms worsen or fail to improve    Note dictated with voice recognition software, please excuse any grammatical errors.        Mihaela Dasilva PA-C      Department of Orthopedic Surgery  [facility]  Office: [phone]  08/05/2025

## (undated) DEVICE — DRAPE CASSETTE 20 X 40

## (undated) DEVICE — NDL BLUNT W/ FILTER 18GX1.5IN

## (undated) DEVICE — HOOD T-5 TEAR AWAY STERILE

## (undated) DEVICE — GLOVE SENSICARE PI GRN 8

## (undated) DEVICE — SCRUB 10% POVIDONE IODINE 4OZ

## (undated) DEVICE — SYR ONLY LUER LOCK 20CC

## (undated) DEVICE — INJECTION SODIUM CHLORIDE 50ML

## (undated) DEVICE — PAD PREP CUFFED NS 24X48IN

## (undated) DEVICE — DECANTER FLUID TRNSF WHITE 9IN

## (undated) DEVICE — BLADE SAW RECIP 76MMX12.5MM

## (undated) DEVICE — BLADE 90X13X1.27MM

## (undated) DEVICE — DRAPE TIBURON ORTHOPEDIC SPLIT

## (undated) DEVICE — MANIFOLD 4 PORT

## (undated) DEVICE — GLOVE SENSICARE PI SURG 7.5

## (undated) DEVICE — PIN PINABALL HEADLESS
Type: IMPLANTABLE DEVICE | Site: KNEE | Status: NON-FUNCTIONAL
Removed: 2025-05-05

## (undated) DEVICE — Device

## (undated) DEVICE — ALCOHOL 70% ISOP W/GREEN 16OZ

## (undated) DEVICE — SPONGE COTTON TRAY 4X4IN

## (undated) DEVICE — SOL IRR NACL .9% 3000ML

## (undated) DEVICE — ELECTRODE BLD 1 INCH TEFLON

## (undated) DEVICE — DURAPREP SURG SCRUB 26ML

## (undated) DEVICE — NDL HYPO STD REG BVL 22GX1.5IN

## (undated) DEVICE — SPONGE LAP 18X18 PREWASHED

## (undated) DEVICE — DRAPE INCISE IOBAN 2 23X23IN

## (undated) DEVICE — SUT STRATAFIX PDS 1 CTX 18IN

## (undated) DEVICE — CLOSURE SKIN STERI STRIP 1/2X4

## (undated) DEVICE — SOL IRR SOD CHL .9% POUR

## (undated) DEVICE — COVER OVERHEAD SURG LT BLUE

## (undated) DEVICE — DRESSING OPTIFOAM AG 4X12IN

## (undated) DEVICE — ELECTRODE REM PLYHSV RETURN 9

## (undated) DEVICE — YANKAUER OPEN TIP W/O VENT

## (undated) DEVICE — NOZZLE BNE INJ CEM FEM POST 65

## (undated) DEVICE — PENCIL ROCKER SWITCH 10FT CORD

## (undated) DEVICE — NDL HYPO STD REG BVL 18GX1.5IN

## (undated) DEVICE — INTERPULSE SET

## (undated) DEVICE — CONTAINER SPECIMEN STRL 3OZ

## (undated) DEVICE — CARD UNIV KNEE NAVGTN SW-SCL L

## (undated) DEVICE — SYR 10CC LUER LOCK

## (undated) DEVICE — ADHESIVE DERMABOND ADVANCED

## (undated) DEVICE — BATTERY INSTRUMENT

## (undated) DEVICE — ADAPTER DUAL IRRIGATION

## (undated) DEVICE — KIT MX BNE CEM REVOLTN W/BRKWY

## (undated) DEVICE — SUT CTD VICRYL 2.0

## (undated) DEVICE — BLADE RMR PATELLA 35MM

## (undated) DEVICE — COVER TABLE HVY DTY 60X90IN

## (undated) DEVICE — SUT VICRYL 1 OB 36 CTX

## (undated) DEVICE — TOURNIQUET SB QC DP 34X4IN